# Patient Record
Sex: FEMALE | Race: WHITE
[De-identification: names, ages, dates, MRNs, and addresses within clinical notes are randomized per-mention and may not be internally consistent; named-entity substitution may affect disease eponyms.]

---

## 2012-09-13 VITALS — DIASTOLIC BLOOD PRESSURE: 78 MMHG | SYSTOLIC BLOOD PRESSURE: 130 MMHG

## 2018-07-01 NOTE — XRAY
Indication: Chest pain.



Comparison: January 12, 2015.



Portable chest demonstrates borderline cardiomegaly, less than before.

Remaining lungs and bony thorax unremarkable.  No new/acute findings.

## 2018-07-01 NOTE — ERPHSYRPT
- History of Present Illness


Time Seen by Provider: 07/01/18 10:47


Source: patient


Exam Limitations: no limitations


Patient Subjective Stated Complaint: sore throat and acid reflux for three 

days.  took extra prilosec yesterday with relief.  patient and mother are 

concerned about taking too much prilosec with the renal failure.


Triage Nursing Assessment: ambulated to room per self.  skin w/d, color sallow.

  resp easy.


Physician History: 





28-year-old white female with history of the asthma, hypothyroidism, anxiety, 

ADD, bipolar depression, OCD


Who states she is on CAPD secondary to renal failure.





Arrives with complaint of chest pain, located in the sternal area described as 

like something is in there and it hurts to swallow.





Patient without shortness of breath no nausea no vomiting.


Patient initially presented to the nurses and stated that she was having a sore 

throat.





Patient states symptoms since 9:00 last night.





Past medical history includes asthma, hypothyroidism, anxiety, ADD, bipolar, 

depression, OCD.





Past surgical history is negative


Timing/Duration: yesterday (last night at 9:00)


Severity: moderate


Modifying Factors: Improves With: nothing


Associated Symptoms: chest pain, No nausea, No vomiting, No abdominal pain, No 

shortness of breath, No heartburn, No diaphoresis, No cough, No chills, No fever

, No headaches, No loss of appetite, No malaise, No rash, No seizure


Allergies/Adverse Reactions: 








sulfamethoxazole [From Bactrim] Allergy (Verified 07/01/18 10:20)


 


trimethoprim [From Bactrim] Allergy (Verified 07/01/18 10:20)


 





Home Medications: 








Alprazolam [Xanax] 1 mg PO DAILY 01/12/15 [History]


Fluvoxamine Maleate 100 mg PO BID 01/12/15 [History]


Ibuprofen 800 mg PO TID 01/12/15 [History]


Levothyroxine Sodium 100 Mcg** [Synthroid 100 Mcg***] 112 mcg PO DAILY 01/12/15 

[History]


Lithium Carbonate 300 mg*** [Lithium Carbonate 300 MG***] 150 mg PO BID 01/12/

15 [History]


Norethindrone-Ethinyl Estrad [Nortrel 1-35 Tablet] 1 each PO DAILY 01/12/15 [

History]


Adalimumab [Humira Pen] 40 mg SQ UD 07/01/18 [History]


Amlodipine Besylate 5 mg*** [Norvasc 5 mg***] 5 mg PO DAILY 07/01/18 [History]


Calcitriol [Rocaltrol] 0.25 mcg PO 3XW 07/01/18 [History]


Cinacalcet HCl [Sensipar] 30 mg PO DAILY 07/01/18 [History]


Ferric Citrate [Auryxia] 420 mg PO UD 07/01/18 [History]


Ferric Citrate [Auryxia] 630 mg PO AC 07/01/18 [History]


Hydrocodone/Acetaminophen [Norco 5-325 Tablet] 1 each PO DAILY 07/01/18 [History

]


Melatonin/Pyridoxine HCl (B6) [Melatonin 10 mg Tablet] 1 each PO HS 07/01/18 [

History]


Omeprazole 40 mg PO DAILY 07/01/18 [History]


Potassium Chloride 10 Meq Tab* [Klor Con 10 MEQ***] 10 meq PO BID 07/01/18 [

History]





Hx Tetanus, Diphtheria Vaccination/Date Given: Yes


Hx Influenza Vaccination/Date Given: Yes


Hx Pneumococcal Vaccination/Date Given: Yes





- Review of Systems


Constitutional: No Fever, No Chills


Eyes: No Symptoms


Ears, Nose, & Throat: No Symptoms


Respiratory: No Cough, No Dyspnea


Cardiac: Chest Pain


Abdominal/Gastrointestinal: No Abdominal Pain, No Nausea, No Vomiting, No 

Diarrhea


Genitourinary Symptoms: No Dysuria


Musculoskeletal: No Back Pain, No Neck Pain


Skin: No Rash


Neurological: No Dizziness, No Focal Weakness, No Sensory Changes


Psychological: No Symptoms


Endocrine: No Symptoms


All Other Systems: Reviewed and Negative





- Past Medical History


Pertinent Past Medical History: Yes


Neurological History: No Pertinent History


Cardiac History: Hypertension


Respiratory History: Asthma


Endocrine Medical History: Hypothyroidism


Musculoskeletal History: Fibromyalgia, Rheumatoid Arthritis


 History: Renal Disease


Psycho-Social History: Anxiety, Attention Deficit Disorder, Bipolar, Depression


Other Medical History: ocd, renal failure





- Past Surgical History


Past Surgical History: Yes


Other Surgical History: fistula placment left arm (does not work), peritoneal 

port placed





- Social History


Smoking Status: Current every day smoker


How long have you smoked: 3


Exposure to second hand smoke: Yes


Drug Use: none


Patient Lives Alone: No





- Female History


Hx Pregnant Now: No





- Nursing Vital Signs


Nursing Vital Signs: 


 Initial Vital Signs











Temperature  98 F   07/01/18 10:15


 


Pulse Rate  77   07/01/18 10:15


 


Respiratory Rate  16   07/01/18 10:15


 


Blood Pressure  147/95   07/01/18 10:15


 


O2 Sat by Pulse Oximetry  98   07/01/18 10:15








 Pain Scale











Pain Intensity                 8

















- Physical Exam


General Appearance: no apparent distress, alert, obese


Eye Exam: PERRL/EOMI, eyes nml inspection


Ears, Nose, Throat Exam: normal ENT inspection, TMs normal, pharynx normal, 

moist mucous membranes


Neck Exam: normal inspection, non-tender, supple, full range of motion


Respiratory Exam: normal breath sounds, lungs clear, No respiratory distress


Cardiovascular Exam: regular rate/rhythm, normal heart sounds, normal 

peripheral pulses


Gastrointestinal/Abdomen Exam: soft, normal bowel sounds, No tenderness, No mass


Back Exam: normal inspection, normal range of motion, No CVA tenderness, No 

vertebral tenderness


Extremity Exam: normal inspection, normal range of motion, pelvis stable


Neurologic Exam: alert, oriented x 3, cooperative, CNs II-XII nml as tested, 

normal mood/affect, nml cerebellar function, nml station & gait, sensation nml, 

No motor deficits


Skin Exam: normal color, warm, dry, No rash


**SpO2 Interpretation**: normal (98%)


SpO2: 98


Oxygen Delivery: Room Air





- Course


Nursing assessment & vital signs reviewed: Yes


EKG Interpreted by Me: RATE (77 bpm), Sinus Rhythm, NORMAL AXIS, Other (EKG: 

Sinus rhythm, 77 bpm, normal axis, Q wave an isolated ST elevation in lead 3, 

no acute changes as compaed to previous ekg)





- Radiology Exams


  ** Chest


X-ray Interpretation: Interpreted by me (no acute disease process noted.)


Ordered Tests: 


 Active Orders 24 hr











 Category Date Time Status


 


 EKG-ER Only STAT Care  07/01/18 10:50 Active


 


 IV Insertion STAT Care  07/01/18 10:50 Active


 


 CHEST 1 VIEW (PORTABLE) Stat Exams  07/01/18 10:51 Taken


 


 AMYLASE Routine Lab  07/01/18 11:15 Completed


 


 AMYLASE Stat Lab  07/01/18 14:33 Completed


 


 CBC W DIFF Stat Lab  07/01/18 11:15 Completed


 


 CMP Routine Lab  07/01/18 11:15 Completed


 


 HCG QUALITATIVE,SERUM Stat Lab  07/01/18 11:15 Completed


 


 LIPASE Routine Lab  07/01/18 11:15 Completed


 


 LIPASE Stat Lab  07/01/18 14:33 Completed


 


 Manual Differential NC Stat Lab  07/01/18 11:15 Completed


 


 TROPONIN Q3H Lab  07/01/18 11:15 Completed


 


 TROPONIN Q3H Lab  07/01/18 14:00 Completed


 


 TROPONIN Q3H Lab  07/01/18 17:00 Ordered


 


 TROPONIN Q3H Lab  07/01/18 20:00 Ordered


 


 TROPONIN Q3H Lab  07/01/18 23:00 Ordered








Medication Summary














Discontinued Medications














Generic Name Dose Route Start Last Admin





  Trade Name Freq  PRN Reason Stop Dose Admin


 


Aspirin  162 mg  07/01/18 10:50  07/01/18 11:03





  Baby Aspirin 81 Mg Chew***  PO  07/01/18 10:51  162 mg





  STAT ONE   Administration





     





     





     





     


 


Morphine Sulfate  4 mg  07/01/18 13:17  07/01/18 13:36





  Morphine Sulfate 4 Mg Inj***  IV  07/01/18 13:18  4 mg





  STAT ONE   Administration





     





     





     





     


 


Morphine Sulfate  Confirm  07/01/18 13:20  





  Morphine Sulfate 4 Mg Inj***  Administered  07/01/18 13:21  





  Dose   





  4 mg   





  .ROUTE   





  .STK-MED ONE   





     





     





     





     


 


Ondansetron HCl  4 mg  07/01/18 13:17  07/01/18 13:36





  Zofran 4 Mg/2 Ml Vial**  IV  07/01/18 13:18  4 mg





  STAT ONE   Administration





     





     





     





     


 


Ondansetron HCl  Confirm  07/01/18 13:20  





  Zofran 4 Mg/2 Ml Vial**  Administered  07/01/18 13:21  





  Dose   





  4 mg   





  .ROUTE   





  .STK-MED ONE   





     





     





     





     











Lab/Rad Data: 


 Laboratory Result Diagrams





 07/01/18 11:15 





 07/01/18 11:15 





 Laboratory Results











  07/01/18 07/01/18 07/01/18 Range/Units





  14:33 14:00 11:15 


 


WBC     (4.0-10.5)  K/mm3


 


RBC     (4.1-5.4)  M/mm3


 


Hgb     (12.0-16.0)  gm/dl


 


Hct     (35-47)  %


 


MCV     ()  fl


 


MCH     (26-32)  pg


 


MCHC     (32-36)  g/dl


 


RDW     (11.5-14.0)  %


 


Plt Count     (150-450)  K/mm3


 


MPV     (6-9.5)  fl


 


Absolute Granulocytes     (1.4-6.9)  


 


Segmented Neutrophils     (36.0-66.0)  %


 


Lymphocytes (Manual)     (24-44)  %


 


Monocytes (Manual)     (0.0-12.0)  %


 


Eosinophils (Manual)     (0.00-3.0)  %


 


Platelet Estimate     (NORMAL)  


 


RBC Morphology     


 


Sodium     (137-145)  mmol/L


 


Potassium     (3.5-5.1)  mmol/L


 


Chloride     ()  mmol/L


 


Carbon Dioxide     (22-30)  mmol/L


 


Anion Gap     (5-15)  MEQ/L


 


BUN     (7-17)  mg/dL


 


Creatinine     (0.52-1.04)  mg/dL


 


Estimated GFR     ML/MIN


 


Glucose     ()  mg/dL


 


Calcium     (8.4-10.2)  mg/dL


 


Total Bilirubin     (0.2-1.3)  mg/dL


 


AST     (14-36)  U/L


 


ALT     (0-35)  U/L


 


Alkaline Phosphatase     ()  U/L


 


Troponin I   0.019   (0.000-0.034)  ng/mL


 


Serum Total Protein     (6.3-8.2)  g/dL


 


Albumin     (3.5-5.0)  g/dL


 


Amylase  69    ()  U/L


 


Lipase  96    ()  U/L


 


Serum Pregnancy, Qual    NEGATIVE  (Negative)  














  07/01/18 07/01/18 Range/Units





  11:15 11:15 


 


WBC   14.4 H  (4.0-10.5)  K/mm3


 


RBC   2.87 L  (4.1-5.4)  M/mm3


 


Hgb   9.1 L  (12.0-16.0)  gm/dl


 


Hct   28.6 L  (35-47)  %


 


MCV   99.7  ()  fl


 


MCH   31.7  (26-32)  pg


 


MCHC   31.8 L  (32-36)  g/dl


 


RDW   13.4  (11.5-14.0)  %


 


Plt Count   295  (150-450)  K/mm3


 


MPV   9.7 H  (6-9.5)  fl


 


Absolute Granulocytes   12.12 H  (1.4-6.9)  


 


Segmented Neutrophils   87 H  (36.0-66.0)  %


 


Lymphocytes (Manual)   8 L  (24-44)  %


 


Monocytes (Manual)   4  (0.0-12.0)  %


 


Eosinophils (Manual)   1  (0.00-3.0)  %


 


Platelet Estimate   NORMAL  (NORMAL)  


 


RBC Morphology   NORMAL  


 


Sodium  137   (137-145)  mmol/L


 


Potassium  3.8   (3.5-5.1)  mmol/L


 


Chloride  98   ()  mmol/L


 


Carbon Dioxide  26   (22-30)  mmol/L


 


Anion Gap  16.1 H   (5-15)  MEQ/L


 


BUN  101 H   (7-17)  mg/dL


 


Creatinine  15.68 H   (0.52-1.04)  mg/dL


 


Estimated GFR  3.0   ML/MIN


 


Glucose  92   ()  mg/dL


 


Calcium  8.3 L   (8.4-10.2)  mg/dL


 


Total Bilirubin  0.40   (0.2-1.3)  mg/dL


 


AST  18   (14-36)  U/L


 


ALT  22   (0-35)  U/L


 


Alkaline Phosphatase  443 H   ()  U/L


 


Troponin I  0.020   (0.000-0.034)  ng/mL


 


Serum Total Protein  6.5   (6.3-8.2)  g/dL


 


Albumin  3.7   (3.5-5.0)  g/dL


 


Amylase  70   ()  U/L


 


Lipase  104   ()  U/L


 


Serum Pregnancy, Qual    (Negative)  














- Progress


Progress: improved


Progress Note: 





07/01/18 13:18


28-year-old white female arrives with complaint of pain in the low sternal area 

since last night initially told the nurse and felt like she was having reflux 

and was making her throat sore however when I talk with the patient she states 

that she had pain.





The pain has been constant since last night.


Patient does have a history of peritoneal dialysis.





Patient with an EKG which shows isolated ST elevation in lead 3 there does not 

appear to be any other changes an EKG is unchanged from previous EKGs.


Patient has a chest x-ray was unremarkable.


Patient with a white count of 14.4 hemoglobin 9.1 hematocrit 28.6 platelets 295.





Patient's chemistry sodium 137 potassium 3.8 chloride 98 bicarbonate 26  

creatinine 15.6 glucose is 92.








Patient's alkaline phosphatase 443 troponin is normal at 0.020 hCG is negative.





Patient is given 281 mg aspirin she states she is still having pain.





Note is made of her markedly elevated BUN and creatinine the patient's mother 

states that is normal for her and she had does peritoneal dialysis at home.


Will give Motrin patient morphine 4 mg IV Zofran 4 mg IV obtain amylase and 

lipase.


And we will obtain repeat troponin.


Patient and her mother are wanting the patient to go home.





07/01/18 14:48


Patient is in no distress she is eating





Repeat troponin within normal limits amylase lipase are within normal limits.





Will discharge patient patient to continue CAPD as directed by her renal 

physician and/or family physician.


She is to follow-up with her family doctor.








- Departure


Time of Disposition: 14:48


Departure Disposition: Home


Clinical Impression: 


 Non-cardiac chest pain





Chronic renal failure


Qualifiers:


 Chronic kidney disease stage: unspecified stage Qualified Code(s): N18.9 - 

Chronic kidney disease, unspecified





Condition: Fair


Critical Care Time: No


Referrals: 


MIHIR MCQUEEN [Primary Care Provider] - 


Additional Instructions: 


Return home.


Plenty of fluids.


Follow-up with your family doctor.  CAPD as directed by your renal physician/

family doctor.


Return for acute distress or for severe symptoms.

## 2019-02-09 NOTE — ERPHSYRPT
- History of Present Illness


Time Seen by Provider: 02/09/19 10:35


Source: patient, family


Exam Limitations: no limitations


Patient Subjective Stated Complaint: WAS CUTTING POTATOES THIS AND AND CUT 2ND 

DIGIT LEFT HAND WITH THE PARING KNIFE.  HAS PUT PRESSURE ON SITE WITH A BANDAID.


Triage Nursing Assessment: AMBULATED TO ROOM PER SELF.  SKIN W/D, COLOR NORMAL, 

RESP EASY.  HAS 2CM LAC TO 2ND DIGIT LEFT HAND WITH SMALL AMT BLEEDING.  IS 

ABLE TO BEND FINGER.


Physician History: 


29 y/o right handed white female was peeling potatoes this am and accidentally 

cut her left index finger. could not stop bleeding. tetanus not utd. 


Timing/Duration: today


Quality: painful


Severity: mild


Location: hands (left index finger)


Associated Symptoms: denies symptoms


Allergies/Adverse Reactions: 








heparin Allergy (Verified 02/09/19 09:52)


 


hydromorphone [From Dilaudid] Allergy (Verified 02/09/19 10:00)


 


naproxen Allergy (Verified 02/09/19 10:00)


 


sulfamethoxazole [From Bactrim] Allergy (Verified 07/01/18 10:20)


 


trimethoprim [From Bactrim] Allergy (Verified 07/01/18 10:20)


 


vancomycin Allergy (Verified 02/09/19 10:00)


 





Home Medications: 








Alprazolam [Xanax] 1 mg PO DAILY 01/12/15 [History]


Fluvoxamine Maleate 100 mg PO BID 01/12/15 [History]


Levothyroxine Sodium 100 Mcg** [Synthroid 100 Mcg***] 112 mcg PO DAILY 01/12/15 

[History]


Lithium Carbonate 300 mg*** [Lithium Carbonate 300 MG***] 150 mg PO BID 01/12/

15 [History]


Adalimumab [Humira Pen] 40 mg SQ UD 07/01/18 [History]


Amlodipine Besylate 5 mg*** [Norvasc 5 mg***] 5 mg PO DAILY 07/01/18 [History]


Calcitriol [Rocaltrol] 0.25 mcg PO 3XW 07/01/18 [History]


Cinacalcet HCl [Sensipar] 30 mg PO DAILY 07/01/18 [History]


Ferric Citrate [Auryxia] 420 mg PO UD 07/01/18 [History]


Ferric Citrate [Auryxia] 630 mg PO AC 07/01/18 [History]


Hydrocodone/Acetaminophen [Norco 5-325 Tablet] 1 each PO DAILY 07/01/18 [History

]


Melatonin/Pyridoxine HCl (B6) [Melatonin 10 mg Tablet] 1 each PO HS 07/01/18 [

History]


Omeprazole 40 mg PO DAILY 07/01/18 [History]


Potassium Chloride 10 Meq Tab* [Klor Con 10 MEQ***] 10 meq PO BID 07/01/18 [

History]


Ergocalciferol (Vitamin D2) [Vitamin D] 50,000 unit PO UD 02/09/19 [History]





Hx Tetanus, Diphtheria Vaccination/Date Given: Yes


Hx Influenza Vaccination/Date Given: Yes


Hx Pneumococcal Vaccination/Date Given: No





- Review of Systems


Constitutional: No Symptoms


Eyes: No Symptoms


Ears, Nose, & Throat: No Symptoms


Respiratory: No Symptoms


Cardiac: No Symptoms


Abdominal/Gastrointestinal: No Symptoms


Genitourinary Symptoms: No Symptoms


Musculoskeletal: No Symptoms


Skin: Other (1cm lac left index finger)


Neurological: No Symptoms


Psychological: No Symptoms


Endocrine: No Symptoms


Hematologic/Lymphatic: No Symptoms


Immunological/Allergic: No Symptoms


All Other Systems: Reviewed and Negative





- Past Medical History


Pertinent Past Medical History: Yes


Neurological History: No Pertinent History


ENT History: No Pertinent History


Cardiac History: Hypertension


Respiratory History: Asthma


Endocrine Medical History: Hypothyroidism


Musculoskeletal History: Fibromyalgia, Rheumatoid Arthritis


GI Medical History: No Pertinent History


 History: Renal Disease


Psycho-Social History: Anxiety, Attention Deficit Disorder, Bipolar, Depression


Female Reproductive Disorders: No Pertinent History


Other Medical History: ocd, renal failure





- Past Surgical History


Past Surgical History: Yes


Neuro Surgical History: No Pertinent History


Cardiac: No Pertinent History


Respiratory: No Pertinent History


Gastrointestinal: No Pertinent History


Genitourinary: No Pertinent History


Musculoskeletal: No Pertinent History


Female Surgical History: No Pertinent History


Other Surgical History: fistula placment left arm (does not work), peritoneal 

port placed





- Social History


Smoking Status: Former smoker


How long have you smoked: 3


Exposure to second hand smoke: Yes


Drug Use: none


Patient Lives Alone: No





- Female History


Hx Pregnant Now: No





- Nursing Vital Signs


Nursing Vital Signs: 


 Initial Vital Signs











Temperature  97.6 F   02/09/19 09:44


 


Pulse Rate  77   02/09/19 09:44


 


Respiratory Rate  16   02/09/19 09:44


 


Blood Pressure  125/83   02/09/19 09:44


 


O2 Sat by Pulse Oximetry  97   02/09/19 09:44








 Pain Scale











Pain Intensity                 3

















- Physical Exam


General Appearance: no apparent distress, alert


Eye Exam: PERRL/EOMI, eyes nml inspection


Ears, Nose, Throat Exam: normal ENT inspection, moist mucous membranes


Neck Exam: normal inspection, non-tender, supple, full range of motion


Respiratory Exam: normal breath sounds, lungs clear, airway intact, No chest 

tenderness, No respiratory distress, No accessory muscle use, No rhonchi, No 

wheezing, No stridor


Cardiovascular Exam: regular rate/rhythm, normal heart sounds, normal 

peripheral pulses


Gastrointestinal/Abdomen Exam: soft, normal bowel sounds, No tenderness, No 

guarding


Pelvic Exam: not done


Rectal Exam: not done


Back Exam: normal inspection, normal range of motion, No CVA tenderness, No 

vertebral tenderness


Extremity Exam: normal inspection, normal range of motion, No pelvis stable


Neurologic Exam: alert, oriented x 3, cooperative, CNs II-XII nml as tested


Skin Exam: normal color, warm, dry, laceration (1cm laceration dorsal aspect 

mid left index finger. oozes blood when finger flexed.  nv intact, tendon 

function intact)


Lymphatic Exam: No adenopathy


SpO2: 97


O2 Delivery: Room Air





Procedures





- Laceration/Wound Repair


  ** Left Dorsal Finger


Wound Location: Left, hand


Wound Length (cm): 1


Wound's Depth, Shape: superficial


Wound Explored: clean


Hibiclens Prep: Yes


Anesthesia: local, 1% Lidocaine


Wound Repaired With: sutures (2)


Suture Size/Type: 4-0, prolene


Number of Sutures: 2


Sterile Dressing Applied?: Yes


Progress: 





02/09/19 11:27


no complications. pt [facility] well





- Course


Nursing assessment & vital signs reviewed: Yes


Ordered Tests: 


Medication Summary














Discontinued Medications














Generic Name Dose Route Start Last Admin





  Trade Name Freq  PRN Reason Stop Dose Admin


 


Bacitracin Zinc  0.9 gm  02/09/19 10:59  02/09/19 11:10





  Baciguent Packet***  TP  02/09/19 11:00  0.9 gm





  STAT ONE   Administration





     





     





     





     


 


Bacitracin Zinc  Confirm  02/09/19 11:07  





  Baciguent Packet***  Administered  02/09/19 11:08  





  Dose   





  1 gm   





  .ROUTE   





  .STK-MED ONE   





     





     





     





     


 


Diphtheria/Tetanus/Acell Pertussis  0.5 ml  02/09/19 10:56  02/09/19 11:09





  Adacel Vial***  IM  02/09/19 10:57  0.5 ml





  .ONCE ONE   Administration





     





     





     





     


 


Diphtheria/Tetanus/Acell Pertussis  Confirm  02/09/19 11:07  





  Adacel Vial***  Administered  02/09/19 11:08  





  Dose   





  0.5 ml   





  IM   





  .STK-MED ONE   





     





     





     





     


 


Lidocaine HCl  50 mg  02/09/19 11:01  02/09/19 11:09





  Xylocaine-Mpf 1% 5ml Sdv***  IJ  02/09/19 11:02  Not Given





  STAT ONE   





     





     





     





     


 


Lidocaine HCl  Confirm  02/09/19 11:07  





  Xylocaine 1% Hcl 20 Ml Mdv***  Administered  02/09/19 11:08  





  Dose   





  5 ml   





  .ROUTE   





  .STK-MED ONE   





     





     





     





     


 


Lidocaine HCl  5 ml  02/09/19 11:08  02/09/19 11:09





  Xylocaine 1% Hcl 20 Ml Mdv***  IJ  02/09/19 11:09  5 ml





  STAT ONE   Administration





     





     





     





     














- Progress


Progress: improved


Counseled pt/family regarding: diagnosis, need for follow-up





- Departure


Time of Disposition: 11:28


Departure Disposition: Home


Clinical Impression: 


 Finger laceration





Condition: Stable


Critical Care Time: No


Referrals: 


JIGNA THOMPSON MD [Primary Care Provider] - 


Additional Instructions: 


keep dry for 24 hours. after 24 hours, may wash daily with soap and water. 

apply antibiotic ointment and bandaid after each wash. suture removal in 8 ot 

10 days.

## 2019-06-11 NOTE — ERPHSYRPT
- History of Present Illness


Historian: patient


Exam Limitations: no limitations


Patient Subjective Stated Complaint: pt is alert and oriented. pt is ambulatory 

with a steady gait. pt comes in with c/o pelvic pain. pt states that she's had 

this pain for "about a week" pt states that she had mirena put in a week ago 

and was also put on Otezla and had a reaction to it that included n/v/d. pt 

states that she is having a small amount of vaginal spotting that is pink in 

color. pt states that she is having some burning with urination. pt bowel 

sounds are normoactive throughout. pt is a peritoneal dialysis pt and has 

dialysis daily. pt was diagnosed with peritonitis at the end of May and was put 

on clindamycin and cubicin for that.


Triage Nursing Assessment: see above


Physician History: 





Pt is a 30 y/o female with a H/O PD, that presented to the ED with abdominal 

pain, that is suprapubic.  Per pt she had her PD fluid checked today and that 

was normal, with no signs of infection.  Pt denies F/C/S.  No SOB or cough.  

She does produce some urine but only minimally.  Pt does complain of signs of 

UTI, and states, had it before.


Timing/Duration: today


Activities at Onset: none


Quality: cramping


Abdominal Pain Onset Location: suprapubic


Pain Radiation: no radiation


Severity of Pain-Max: mild


Severity of Pain-Current: mild


Modifying Factors: Improves With: analgesics


Associated Symptoms: denies symptoms


Previous symptoms: no prior history


Allergies/Adverse Reactions: 








heparin Allergy (Verified 02/09/19 09:52)


 


hydromorphone [From Dilaudid] Allergy (Verified 02/09/19 10:00)


 


naproxen Allergy (Verified 02/09/19 10:00)


 


sulfamethoxazole [From Bactrim] Allergy (Verified 07/01/18 10:20)


 


trimethoprim [From Bactrim] Allergy (Verified 07/01/18 10:20)


 


vancomycin Allergy (Verified 02/09/19 10:00)


 





Home Medications: 








Alprazolam [Xanax] 0.5 mg PO BID 01/12/15 [History]


Fluvoxamine Maleate 100 mg PO BID 01/12/15 [History]


Levothyroxine Sodium 100 Mcg** [Synthroid 100 Mcg***] 137 mcg PO DAILY 01/12/15 

[History]


Lithium Carbonate 300 mg*** [Lithium Carbonate 300 MG***] 150 mg PO BID 01/12/

15 [History]


Amlodipine Besylate 5 mg*** [Norvasc 5 mg***] 5 mg PO DAILY 07/01/18 [History]


Calcitriol [Rocaltrol] 0.5 mcg PO DAILY 07/01/18 [History]


Ferric Citrate [Auryxia] 420 mg PO UD 07/01/18 [History]


Ferric Citrate [Auryxia] 630 mg PO AC 07/01/18 [History]


Hydrocodone/Acetaminophen [Norco 5-325 Tablet] 1 each PO BID PRN PRN 07/01/18 [

History]


Melatonin/Pyridoxine HCl (B6) [Melatonin 10 mg Tablet] 10 mg PO HS 07/01/18 [

History]


Omeprazole 40 mg PO DAILY 07/01/18 [History]


Potassium Chloride 10 Meq Tab* [Klor Con 10 MEQ***] 80 meq PO DAILY 07/01/18 [

History]


Ergocalciferol (Vitamin D2) [Vitamin D] 50,000 unit PO UD 02/09/19 [History]


Albuterol Sulfate [Proair Hfa] 8.5 gm IH Q4H PRN PRN 06/11/19 [History]





Hx Tetanus, Diphtheria Vaccination/Date Given: Yes


Hx Influenza Vaccination/Date Given: Yes


Hx Pneumococcal Vaccination/Date Given: No


Immunizations Up to Date: Yes





- Review of Systems


Constitutional: No Fever, No Chills


Eyes: No Symptoms


Ears, Nose, & Throat: No Symptoms


Respiratory: No Cough, No Dyspnea


Cardiac: No Chest Pain, No Edema, No Syncope


Abdominal/Gastrointestinal: Abdominal Pain (suprapubic)


Genitourinary Symptoms: No Symptoms, Other (PD pt.)


Musculoskeletal: No Back Pain, No Neck Pain


Neurological: No Dizziness, No Focal Weakness, No Sensory Changes





- Past Medical History


Pertinent Past Medical History: Yes


Neurological History: No Pertinent History


ENT History: No Pertinent History


Cardiac History: Hypertension


Respiratory History: Asthma


Endocrine Medical History: Hypothyroidism


Musculoskeletal History: Fibromyalgia, Rheumatoid Arthritis


GI Medical History: No Pertinent History


 History: Renal Disease


Psycho-Social History: Anxiety, Attention Deficit Disorder, Bipolar, Depression


Female Reproductive Disorders: No Pertinent History


Other Medical History: ocd, renal failure





- Past Surgical History


Past Surgical History: Yes


Neuro Surgical History: No Pertinent History


Cardiac: Cardiac Catheterization


Respiratory: No Pertinent History


Gastrointestinal: No Pertinent History


Genitourinary: Other


Musculoskeletal: No Pertinent History


Female Surgical History: No Pertinent History


Other Surgical History: fistula placment left arm (does not work), peritoneal 

port placed, mirena placement, central line, catheter placements, kidney biopsy





- Social History


Smoking Status: Former smoker


How long have you smoked: 3


Exposure to second hand smoke: Yes


Drug Use: none


Patient Lives Alone: No





- Female History


Hx Pregnant Now: No (mirena a week ago)





- Nursing Vital Signs


Nursing Vital Signs: 





 Initial Vital Signs











Temperature  97.9 F   06/11/19 20:33


 


Pulse Rate  88   06/11/19 20:33


 


Respiratory Rate  18   06/11/19 20:33


 


Blood Pressure  151/102   06/11/19 20:33


 


O2 Sat by Pulse Oximetry  98   06/11/19 20:33








 Pain Scale











Pain Intensity                 8

















- Physical Exam


General Appearance: no apparent distress, alert


Eye Exam: PERRL/EOMI, eyes nml inspection


Ears, Nose, Throat Exam: normal ENT inspection, pharynx normal, moist mucous 

membranes


Neck Exam: normal inspection, non-tender, supple, full range of motion


Respiratory Exam: normal breath sounds, lungs clear, No respiratory distress


Cardiovascular Exam: regular rate/rhythm, normal heart sounds


Gastrointestinal/Abdomen Exam: soft, tenderness (suprapubic), No mass


Back Exam: normal inspection, normal range of motion, No CVA tenderness, No 

vertebral tenderness


Extremity Exam: normal inspection, normal range of motion, pelvis stable


Neurologic Exam: alert, oriented x 3, cooperative, normal mood/affect, nml 

cerebellar function, sensation nml, No motor deficits


SpO2: 100


Ordered Tests: 





 Active Orders 24 hr











 Category Date Time Status


 


 IV Insertion STAT Care  06/11/19 20:57 Active


 


 Urinalysis with Microscopy Stat Lab  06/11/19 21:07 Ordered








Medication Summary











Generic Name Dose Route Start Last Admin





  Trade Name Freq  PRN Reason Stop Dose Admin


 


Ceftriaxone Sodium/Dextrose  1 g in 50 mls @ 100 mls/hr  06/11/19 22:18  





  Rocephin 1 Gm-D5w 50 Ml Bag**  IV  06/11/19 22:47  





  STAT STA   





     





     





     





     














Discontinued Medications














Generic Name Dose Route Start Last Admin





  Trade Name Freq  PRN Reason Stop Dose Admin


 


Hydrocodone Bitart/Acetaminophen  1 tab  06/11/19 22:11  06/11/19 22:14





  Norco 10/325 Mg Tablet***  PO  06/11/19 22:12  1 tab





  ONCE STA   Administration





     





     





     





     


 


Hydrocodone Bitart/Acetaminophen  Confirm  06/11/19 22:13  





  Norco 10/325 Mg Tablet***  Administered  06/11/19 22:14  





  Dose   





  1 tab   





  .ROUTE   





  .STK-MED ONE   





     





     





     





     














- Progress


Progress: unchanged


Progress Note: 





06/11/19 22:23


Pt was seen and examined.  UA was sent, but as pt produced very little urine, 

it was not enough to check.  I explained to the pt, I am treating her 

empirically for UTI, and I gave her Ceftriaxone IV 1gr.  Pt got Norco for pain.

  Pt is advised to see her Nephrologist for ABX dosing, secondarey to being PD 

pt.  For now that ABX will cover her.  


Discussed with : Stephon


Will see patient in: office


Counseled pt/family regarding: need for follow-up





- Departure


Departure Disposition: Home


Clinical Impression: 


 UTI (urinary tract infection)





Condition: Stable


Critical Care Time: No


Referrals: 


JIGNA TOHMPSON MD [Primary Care Provider] - 


Additional Instructions: 


F/U with Nephrology in the AM, for continuing ABX therapy and dosing.  F/U with 

PCP.

## 2019-09-15 ENCOUNTER — HOSPITAL ENCOUNTER (EMERGENCY)
Dept: HOSPITAL 33 - ED | Age: 29
LOS: 1 days | Discharge: HOME | End: 2019-09-16
Payer: MEDICARE

## 2019-09-15 DIAGNOSIS — R91.8: Primary | ICD-10-CM

## 2019-09-15 DIAGNOSIS — R05: ICD-10-CM

## 2019-09-15 DIAGNOSIS — R03.0: ICD-10-CM

## 2019-09-15 PROCEDURE — 99283 EMERGENCY DEPT VISIT LOW MDM: CPT

## 2019-09-15 PROCEDURE — 71046 X-RAY EXAM CHEST 2 VIEWS: CPT

## 2019-09-15 NOTE — ERPHSYRPT
- History of Present Illness


Time Seen by Provider: 09/15/19 23:30


Source: family


Exam Limitations: no limitations


Patient Subjective Stated Complaint: pt c/o cough, congested, and difficulty 

bringing up any sputum with cough.  Hoarseness, sore to neck area.  Pt saw 

[place] NP on Thursday, was given atb and steroid, no improvement.


Triage Nursing Assessment: pt alert and oriented x3, pleasant.  Lungs clear 

throughout, heart tones reg, abd soft with active bs x4 quad, nontender.  Pt c/

o cough and having trouble getting up secretions.  Pt c/o neck pain, achiness 

to that area.


Physician History: 





Patient has had a cough and congestion for 6 days.  Patient was seen 3 days ago 

and started on a Z-pack as her brother was recently diagnosed with viral 

pneumonia.  Patient is not any better and mother is requesting a chest x-ray


Timing/Duration: day(s) (6)


Cough Quality/Degree: moderate, dry cough


Possible Cause: frequent episodes


Modifying Factors: Improves With: albuterol nebulizer (helps her cough ).  

Worsens With: deep breath, rest


Associated Symptoms: cough, muscle aches, nasal drainage, wheezing, No fever, 

No chills, No chest pain/soreness, No dizziness, No earache, No facial pain, No 

headache, No shortness of breath, No sinus infection, No sore throat


International travel in last 2 weeks: No


Allergies/Adverse Reactions: 








hydromorphone [From Dilaudid] Allergy (Verified 02/09/19 10:00)


 


naproxen Allergy (Verified 02/09/19 10:00)


 


sulfamethoxazole [From Bactrim] Allergy (Verified 07/01/18 10:20)


 


trimethoprim [From Bactrim] Allergy (Verified 07/01/18 10:20)


 


vancomycin Allergy (Verified 02/09/19 10:00)


 





Home Medications: 








Alprazolam [Xanax] 0.5 mg PO BID 01/12/15 [History]


Fluvoxamine Maleate 100 mg PO BID 01/12/15 [History]


Levothyroxine Sodium 100 Mcg** [Synthroid 100 Mcg***] 137 mcg PO DAILY 01/12/15 

[History]


Lithium Carbonate 300 mg*** [Lithium Carbonate 300 MG***] 150 mg PO BID 01/12/

15 [History]


Amlodipine Besylate 5 mg*** [Norvasc 5 mg***] 5 mg PO DAILY 07/01/18 [History]


Calcitriol [Rocaltrol] 0.5 mcg PO DAILY 07/01/18 [History]


Ferric Citrate [Auryxia] 420 mg PO UD 07/01/18 [History]


Ferric Citrate [Auryxia] 630 mg PO AC 07/01/18 [History]


Hydrocodone/Acetaminophen [Norco 5-325 Tablet] 1 each PO BID PRN PRN 07/01/18 [

History]


Melatonin/Pyridoxine HCl (B6) [Melatonin 10 mg Tablet] 10 mg PO HS 07/01/18 [

History]


Ergocalciferol (Vitamin D2) [Vitamin D] 50,000 unit PO UD 02/09/19 [History]


Albuterol Sulfate [Proair Hfa] 8.5 gm IH Q4H PRN PRN 06/11/19 [History]


Carvedilol 12.5 mg*** [Coreg 12.5 mg***] 12.5 mg PO BID 09/15/19 [History]


Pantoprazole Sodium [Protonix] 40 mg PO DAILY 09/15/19 [History]





Hx Tetanus, Diphtheria Vaccination/Date Given: Yes


Hx Influenza Vaccination/Date Given: Yes


Hx Pneumococcal Vaccination/Date Given: Yes


Immunizations Up to Date: Yes





- Review of Systems


Constitutional: No Fever, No Chills


Eyes: No Eye Pain, No Eye Redness, No Vision Changes


Ears, Nose, & Throat: No Symptoms, Nose Congestion, No Ear Discharge, No Throat 

Pain


Respiratory: No Cough, No Dyspnea


Cardiac: No Chest Pain, No Edema, No Syncope


Abdominal/Gastrointestinal: No Abdominal Pain, No Nausea, No Vomiting, No 

Diarrhea


Genitourinary Symptoms: No Dysuria, No Flank Pain


Musculoskeletal: Myalgias, No Back Pain, No Neck Pain


Skin: No Rash


Neurological: No Dizziness, No Focal Weakness, No Sensory Changes


Psychological: No Symptoms


Endocrine: No Symptoms


Hematologic/Lymphatic: No Easy Bleeding, No Easy Bruising


All Other Systems: Reviewed and Negative





- Past Medical History


Pertinent Past Medical History: Yes


Neurological History: No Pertinent History


ENT History: No Pertinent History


Cardiac History: Hypertension


Respiratory History: Asthma


Endocrine Medical History: Hypothyroidism


Musculoskeletal History: Fibromyalgia, Rheumatoid Arthritis


GI Medical History: No Pertinent History


 History: Renal Disease


Psycho-Social History: Anxiety, Attention Deficit Disorder, Bipolar, Depression


Female Reproductive Disorders: No Pertinent History


Other Medical History: ocd, renal failure, autoimmune disorder





- Past Surgical History


Past Surgical History: Yes


Neuro Surgical History: No Pertinent History


Cardiac: Cardiac Catheterization


Respiratory: No Pertinent History


Gastrointestinal: No Pertinent History


Genitourinary: Other


Musculoskeletal: No Pertinent History


Female Surgical History: No Pertinent History


Other Surgical History: fistula placment left arm (does not work), dialysis 

cath rt subclavian, mirena placement, kidney biopsy





- Social History


Smoking Status: Former smoker


How long have you smoked: 3


Exposure to second hand smoke: Yes


Drug Use: none


Patient Lives Alone: No





- Female History


Hx Last Menstrual Period: mirena


Hx Pregnant Now: No





- Nursing Vital Signs


Nursing Vital Signs: 


 Initial Vital Signs











Temperature  98.3 F   09/15/19 23:08


 


Pulse Rate  73   09/15/19 23:08


 


Respiratory Rate  17   09/15/19 23:08


 


Blood Pressure  128/88   09/15/19 23:08


 


O2 Sat by Pulse Oximetry  96   09/15/19 23:08








 Pain Scale











Pain Intensity                 0

















- Physical Exam


General Appearance: no apparent distress, alert


Eye Exam: PERRL/EOMI, eyes nml inspection, No scleral icterus


Ears, Nose, Throat Exam: normal ENT inspection, TMs normal, pharynx normal, 

moist mucous membranes


Neck Exam: normal inspection, non-tender, supple, full range of motion


Respiratory Exam: normal breath sounds, lungs clear, airway intact, No chest 

tenderness, No respiratory distress, No diminished breath sounds, No accessory 

muscle use, No prolonged expirations, No crackles/rales, No rhonchi, No wheezing

, No stridor


Cardiovascular Exam: regular rate/rhythm, normal heart sounds, capillary refill 

<2 sec


Gastrointestinal/Abdomen Exam: soft, No tenderness, No distention, No mass


Back Exam: normal inspection, No CVA tenderness, No vertebral tenderness


Extremity Exam: normal inspection, normal range of motion


Neurologic Exam: alert, oriented x 3, cooperative, CNs II-XII nml as tested, 

normal mood/affect, sensation nml, No motor deficits


Skin Exam: normal color, warm, dry, No rash


Lymphatic Exam: No adenopathy


**SpO2 Interpretation**: normal


SpO2: 96


O2 Delivery: Room Air


Ordered Tests: 


 Active Orders 24 hr











 Category Date Time Status


 


 CHEST 2 VIEWS (PA AND LAT) Stat Exams  09/16/19 00:51 Taken














- Progress


Progress: re-examined


Air Movement: good


Progress Note: 





09/16/19 01:41


patient has excellent air movement throughout her lung fields without any 

abnormal breath sounds, no respiratory distress, no hypoxia with no tachypnea 

and no accessory muscle use noted.


Blood Culture(s) Obtained: No


Antibiotics given: No


Counseled pt/family regarding: diagnosis, need for follow-up, rad results





- Departure


Departure Disposition: Home


Clinical Impression: 


 Cough in adult, Elevated blood pressure reading without diagnosis of 

hypertension





Condition: Good


Critical Care Time: No


Referrals: 


JIGNA THOMPSON MD [Primary Care Provider] - Follow Up with PCP/3 days


Instructions:  Cough, Adult (DC)


Additional Instructions: 


We will notify you if the Radiologist interprets anything different from the ED 

physician's interpretation that will change your management.  Return if any 

worse at any time if you have worse cough, worse pain, new shortness of breath, 

new chest pain, new fever or any other concerning signs or symptoms for 

immediate re-evaluation in the emergency department.

## 2019-09-16 VITALS — SYSTOLIC BLOOD PRESSURE: 130 MMHG | DIASTOLIC BLOOD PRESSURE: 73 MMHG | HEART RATE: 70 BPM | OXYGEN SATURATION: 96 %

## 2019-09-16 NOTE — XRAY
Indication: Cough.



Comparison: July 1, 2018.



PA/lateral chest demonstrates new lingula infiltrate/atelectasis and new right

double lumen dialysis catheter.  Stable minimal left midlung calcified pleural

plaquing.  Remaining heart, lungs, and bony thorax unremarkable.



Comment: Lingula finding not reported by interpreting ER clinician.  Telephone

report given to Dr. Person in the ER at 0920 hrs. on September 16, 2019.

## 2020-07-24 ENCOUNTER — HOSPITAL ENCOUNTER (EMERGENCY)
Dept: HOSPITAL 33 - ED | Age: 30
Discharge: HOME | End: 2020-07-24
Payer: MEDICARE

## 2020-07-24 VITALS — OXYGEN SATURATION: 99 % | SYSTOLIC BLOOD PRESSURE: 116 MMHG | DIASTOLIC BLOOD PRESSURE: 58 MMHG

## 2020-07-24 VITALS — HEART RATE: 85 BPM

## 2020-07-24 DIAGNOSIS — S76.011A: Primary | ICD-10-CM

## 2020-07-24 DIAGNOSIS — Z79.891: ICD-10-CM

## 2020-07-24 DIAGNOSIS — M25.551: ICD-10-CM

## 2020-07-24 DIAGNOSIS — I10: ICD-10-CM

## 2020-07-24 DIAGNOSIS — Z79.899: ICD-10-CM

## 2020-07-24 PROCEDURE — 73502 X-RAY EXAM HIP UNI 2-3 VIEWS: CPT

## 2020-07-24 PROCEDURE — 99284 EMERGENCY DEPT VISIT MOD MDM: CPT

## 2020-07-24 PROCEDURE — 96372 THER/PROPH/DIAG INJ SC/IM: CPT

## 2020-07-24 NOTE — ERPHSYRPT
- History of Present Illness


Time Seen by Provider: 07/24/20 19:00


Source: patient, EMS


Exam Limitations: no limitations


Patient Subjective Stated Complaint: PT states "I am not sure what I did, My 

right hip really hurts."


Triage Nursing Assessment: Pt presented alert and oriented X 3, skin pwd. Pt 

able to speak in clear full sentences pt in no apparent respiratory distress. Pt

csm X 4


Physician History: 





30 years old with history of psoriasis presented in the ER with chief complaint 

of right hip pain since she woke up this morning.  Described this as a sharp 

shooting pain moderate to severe intensity, making her unable to bear weight.  

She has been limping.  Relief with resting.  Denies any swelling numbness 

tingling or weakness of right lower extremity.  Denies any fall or trauma.  She 

is able to move her knee and ankle/toes without any limitation.


Allergies/Adverse Reactions: 








hydromorphone [From Dilaudid] Allergy (Verified 02/09/19 10:00)


   


naproxen Allergy (Verified 02/09/19 10:00)


   


sulfamethoxazole [From Bactrim] Allergy (Verified 07/01/18 10:20)


   


trimethoprim [From Bactrim] Allergy (Verified 07/01/18 10:20)


   


vancomycin Allergy (Verified 02/09/19 10:00)


   





Home Medications: 








Alprazolam [Xanax] 0.5 mg PO BID 01/12/15 [History]


Fluvoxamine Maleate 100 mg PO BID 01/12/15 [History]


Levothyroxine Sodium 100 Mcg** [Synthroid 100 Mcg***] 137 mcg PO DAILY 01/12/15 

[History]


Lithium Carbonate 300 mg*** [Lithium Carbonate 300 MG***] 150 mg PO BID 01/12/15

[History]


Amlodipine Besylate 5 mg*** [Norvasc 5 mg***] 5 mg PO DAILY 07/01/18 [History]


Calcitriol [Rocaltrol] 0.5 mcg PO DAILY 07/01/18 [History]


Ferric Citrate [Auryxia] 420 mg PO UD 07/01/18 [History]


Ferric Citrate [Auryxia] 630 mg PO AC 07/01/18 [History]


Hydrocodone/Acetaminophen [Norco 5-325 Tablet] 1 each PO BID PRN PRN 07/01/18 

[History]


Melatonin/Pyridoxine HCl (B6) [Melatonin 10 mg Tablet] 10 mg PO HS 07/01/18 

[History]


Ergocalciferol (Vitamin D2) [Vitamin D] 50,000 unit PO UD 02/09/19 [History]


Albuterol Sulfate [Proair Hfa] 8.5 gm IH Q4H PRN PRN 06/11/19 [History]


Carvedilol 12.5 mg*** [Coreg 12.5 mg***] 12.5 mg PO BID 09/15/19 [History]


Pantoprazole Sodium [Protonix] 40 mg PO DAILY 09/15/19 [History]





Hx Tetanus, Diphtheria Vaccination/Date Given: No


Hx Influenza Vaccination/Date Given: Yes


Hx Pneumococcal Vaccination/Date Given: No


Immunizations Up to Date: Yes





Travel Risk





- International Travel


Have you traveled outside of the country in past 3 weeks: No





- Coronavirus Screening


Are you exhibiting any of the following symptoms?: No


Close contact with a COVID-19 positive Pt in past 14-21 Days: No





- Review of Systems


Constitutional: No Symptoms


Eyes: No Symptoms


Ears, Nose, & Throat: No Symptoms


Respiratory: No Symptoms


Cardiac: No Symptoms


Abdominal/Gastrointestinal: No Symptoms


Genitourinary Symptoms: No Symptoms


Musculoskeletal: Joint Pain


Skin: No Symptoms


Neurological: No Symptoms


Psychological: No Symptoms


Endocrine: No Symptoms


Hematologic/Lymphatic: No Symptoms


Immunological/Allergic: No Symptoms





- Past Medical History


Pertinent Past Medical History: Yes


Neurological History: No Pertinent History


ENT History: No Pertinent History


Cardiac History: Hypertension, Other


Respiratory History: No Pertinent History


Endocrine Medical History: Hypothyroidism, Other


Musculoskeletal History: Fibromyalgia, Rheumatoid Arthritis


GI Medical History: No Pertinent History


 History: Renal Disease


Psycho-Social History: Anxiety, Attention Deficit Disorder, Bipolar, Depression


Female Reproductive Disorders: No Pertinent History


Other Medical History: ANXIETY, DEPRESSION, BIPOLAR, OCD.  LEFT FISTULAR UPPER 

ARM FOR DIALYSIS.  MONDAY, WEDNESDAY, AND FRIDAY.





- Past Surgical History


Past Surgical History: Yes


Neuro Surgical History: No Pertinent History


Cardiac: Cardiac Catheterization


Respiratory: No Pertinent History


Gastrointestinal: No Pertinent History


Genitourinary: Other


Musculoskeletal: No Pertinent History


Female Surgical History: No Pertinent History


Other Surgical History: fistula placment left arm (does not work), dialysis cath

 rt subclavian, mirena placement, kidney biopsy





- Social History


Smoking Status: Former smoker


How long have you smoked: 3


Exposure to second hand smoke: Yes


Drug Use: none


Patient Lives Alone: No





- Female History


Hx Last Menstrual Period: mirena


Hx Pregnant Now: No





- Nursing Vital Signs


Nursing Vital Signs: 


                               Initial Vital Signs











Temperature  97.8 F   07/24/20 18:53


 


Pulse Rate  95 H  07/24/20 18:53


 


Respiratory Rate  20 07/24/20 18:53


 


Blood Pressure  109/76   07/24/20 18:53


 


O2 Sat by Pulse Oximetry  100   07/24/20 18:53








                                   Pain Scale











Pain Intensity                 6

















- Physical Exam


General Appearance: no apparent distress


Eyes, Ears, Nose, Throat Exam: normal ENT inspection


Neck Exam: normal inspection, supple, full range of motion


Cardiovascular/Respiratory Exam: chest non-tender, regular rate/rhythm


Gastrointestinal/Abdominal Exam: non-tender, soft, no organomegaly, No guarding,

 No tenderness


Back Exam: normal inspection, normal range of motion, No CVA tenderness


Hips Exam: right: pain, soft tissue tenderness, bilateral: non-tender, normal 

inspection, normal range of motion, no evidence of injury, bone tenderness


Legs Exam: bilateral leg: non-tender, normal inspection, normal range of motion,

 no evidence of injury


Knees Exam: bilateral knee: non-tender, normal inspection, normal range of 

motion, no evidence of injury


Ankle Exam: bilateral ankle: non-tender, normal inspection, normal range of 

motion, no evidence of injury


Foot Exam: bilateral foot: non-tender, normal inspection, normal range of 

motion, no evidence of injury


Neuro/Tendon Exam: normal sensation, normal motor functions, normal tendon 

functions


Mental Status Exam: alert, oriented x 3, cooperative


Skin Exam: normal color


**SpO2 Interpretation**: normal


SpO2: 100


O2 Delivery: Room Air


Ordered Tests: 


                               Active Orders 24 hr











 Category Date Time Status


 


 HIP UNI (2V) INCL PEL IF DONE Stat Exams  07/24/20 21:16 Completed








Medication Summary














Discontinued Medications














Generic Name Dose Route Start Last Admin





  Trade Name Freq  PRN Reason Stop Dose Admin


 


Ketorolac Tromethamine  30 mg  07/24/20 19:04  07/24/20 19:42





  Toradol 30 Mg Injection***  IM  07/24/20 19:05  30 mg





  STAT ONE   Administration


 


Ketorolac Tromethamine  Confirm  07/24/20 19:41 





  Toradol 30 Mg Injection***  Administered  07/24/20 19:42 





  Dose  





  30 mg  





  .ROUTE  





  .STK-MED ONE  


 


Oxycodone/Acetaminophen  2 tab  07/24/20 20:57  07/24/20 21:02





  Percocet Tablet 5/325mg***  PO  07/24/20 20:58  2 tab





  STAT ONE   Administration


 


Oxycodone/Acetaminophen  Confirm  07/24/20 21:02 





  Percocet Tablet 5/325mg***  Administered  07/24/20 21:03 





  Dose  





  2 tab  





  .ROUTE  





  .STK-MED ONE  














- Progress


Progress: improved


Progress Note: 





07/24/20 


She is given Toradol for pain.  Obtain x-rays which are negative for any obvious

 fracture dislocation reviewed by me.  Official report is pending.  Patient is 

feeling better on reevaluation.  She has no signs of cellulitis.  No swelling or

 calf tenderness or thigh tenderness suggesting DVT.  I believe patient has hip 

strain, will continue with NSAIDs and muscle relaxant to go home.  Discussed 

signs symptoms of worsening needing return to ER which she seemed understanding.

  Stable for discharge.





- Departure


Departure Disposition: Home


Clinical Impression: 


Hip strain


Qualifiers:


 Encounter type: initial encounter Laterality: right Qualified Code(s): S76.011A

 - Strain of muscle, fascia and tendon of right hip, initial encounter





Condition: Stable


Critical Care Time: No


Referrals: 


JIGNA THOMPSON MD [Primary Care Provider] - Follow Up with PCP/3 days


Instructions:  Lower Extremity Muscle Strain (DC), Hip Pain (DC)


Additional Instructions: 


Tylenol/ibuprofen as needed along with muscle relaxants.  Follow-up with primary

care for reevaluation.  Return to ER for any worsening.


Prescriptions: 


Methocarbamol [Robaxin-750] 750 mg PO TID 10 Days #30 tablet

## 2020-07-24 NOTE — XRAY
Indication: Pain.  No known injury.



Comparison: None



2 view right hip demonstrates IUD in situ.  No other bony, articular, or soft

tissue abnormalities.

## 2020-12-26 ENCOUNTER — HOSPITAL ENCOUNTER (EMERGENCY)
Dept: HOSPITAL 33 - ED | Age: 30
Discharge: HOME | End: 2020-12-26
Payer: MEDICARE

## 2020-12-26 VITALS — HEART RATE: 84 BPM | OXYGEN SATURATION: 96 % | SYSTOLIC BLOOD PRESSURE: 150 MMHG | DIASTOLIC BLOOD PRESSURE: 85 MMHG

## 2020-12-26 DIAGNOSIS — N83.202: ICD-10-CM

## 2020-12-26 DIAGNOSIS — R03.0: ICD-10-CM

## 2020-12-26 DIAGNOSIS — N19: Primary | ICD-10-CM

## 2020-12-26 LAB
ALBUMIN SERPL-MCNC: 4.4 G/DL (ref 3.5–5)
ALP SERPL-CCNC: 363 U/L (ref 38–126)
ALT SERPL-CCNC: 13 U/L (ref 0–35)
AMYLASE SERPL-CCNC: 110 U/L (ref 30–110)
ANION GAP SERPL CALC-SCNC: 18.7 MEQ/L (ref 5–15)
AST SERPL QL: 20 U/L (ref 14–36)
BASOPHILS # BLD AUTO: 0.02 10*3/UL (ref 0–0.4)
BASOPHILS NFR BLD AUTO: 0.2 % (ref 0–0.4)
BILIRUB BLD-MCNC: 0.5 MG/DL (ref 0.2–1.3)
BUN SERPL-MCNC: 68 MG/DL (ref 7–17)
CALCIUM SPEC-MCNC: 9 MG/DL (ref 8.4–10.2)
CHLORIDE SERPL-SCNC: 92 MMOL/L (ref 98–107)
CO2 SERPL-SCNC: 29 MMOL/L (ref 22–30)
CREAT SERPL-MCNC: 11.55 MG/DL (ref 0.52–1.04)
EOSINOPHIL # BLD AUTO: 0.16 10*3/UL (ref 0–0.5)
GFR SERPLBLD BASED ON 1.73 SQ M-ARVRAT: 4.1 ML/MIN
GLUCOSE SERPL-MCNC: 89 MG/DL (ref 74–106)
HCT VFR BLD AUTO: 25.2 % (ref 35–47)
HGB BLD-MCNC: 7.8 GM/DL (ref 12–16)
LIPASE SERPL-CCNC: 155 U/L (ref 23–300)
LYMPHOCYTES # SPEC AUTO: 1.28 10*3/UL (ref 1–4.6)
MCH RBC QN AUTO: 32.9 PG (ref 26–32)
MCHC RBC AUTO-ENTMCNC: 31 G/DL (ref 32–36)
MONOCYTES # BLD AUTO: 0.46 10*3/UL (ref 0–1.3)
PLATELET # BLD AUTO: 257 K/MM3 (ref 150–450)
POTASSIUM SERPLBLD-SCNC: 5.6 MMOL/L (ref 3.5–5.1)
PROT SERPL-MCNC: 7.9 G/DL (ref 6.3–8.2)
RBC # BLD AUTO: 2.37 M/MM3 (ref 4.1–5.4)
SODIUM SERPL-SCNC: 134 MMOL/L (ref 137–145)
WBC # BLD AUTO: 8.4 K/MM3 (ref 4–10.5)

## 2020-12-26 PROCEDURE — 36000 PLACE NEEDLE IN VEIN: CPT

## 2020-12-26 PROCEDURE — 96376 TX/PRO/DX INJ SAME DRUG ADON: CPT

## 2020-12-26 PROCEDURE — 74176 CT ABD & PELVIS W/O CONTRAST: CPT

## 2020-12-26 PROCEDURE — 84484 ASSAY OF TROPONIN QUANT: CPT

## 2020-12-26 PROCEDURE — 96374 THER/PROPH/DIAG INJ IV PUSH: CPT

## 2020-12-26 PROCEDURE — 83605 ASSAY OF LACTIC ACID: CPT

## 2020-12-26 PROCEDURE — 85025 COMPLETE CBC W/AUTO DIFF WBC: CPT

## 2020-12-26 PROCEDURE — 99285 EMERGENCY DEPT VISIT HI MDM: CPT

## 2020-12-26 PROCEDURE — 93005 ELECTROCARDIOGRAM TRACING: CPT

## 2020-12-26 PROCEDURE — 96375 TX/PRO/DX INJ NEW DRUG ADDON: CPT

## 2020-12-26 PROCEDURE — 81025 URINE PREGNANCY TEST: CPT

## 2020-12-26 PROCEDURE — 83690 ASSAY OF LIPASE: CPT

## 2020-12-26 PROCEDURE — 96360 HYDRATION IV INFUSION INIT: CPT

## 2020-12-26 PROCEDURE — 36415 COLL VENOUS BLD VENIPUNCTURE: CPT

## 2020-12-26 PROCEDURE — 76830 TRANSVAGINAL US NON-OB: CPT

## 2020-12-26 PROCEDURE — 82150 ASSAY OF AMYLASE: CPT

## 2020-12-26 PROCEDURE — 80053 COMPREHEN METABOLIC PANEL: CPT

## 2020-12-26 NOTE — XRAY
Indication: Left pelvic pain.



Two-dimensional transabdominal pelvic sonogram performed.



Comparison: None



Uterus anteverted measuring 5.5 x 3.3 x 3.7 cm.  No focal solid/cystic uterine

mass.  IUD with tip at the level of the fundus.  Right ovary measures 3.3 x

2.2 x 2.0 cm and the left measures 5.5 x 3.9 x 4.0 cm.  Left ovary

demonstrates a 3.2 x 1.9 x 2.4 cm irregular complex echogenic cyst.  No

suspicious solid adnexal masses or free fluid.



Impression: 3.2 cm complex left ovary cyst better evaluated with transvaginal

sonogram if patient is able.  Incidental IUD in situ.



Comment: Preliminary report was given.

## 2020-12-26 NOTE — XRAY
Indication: Left flank pain.



Multiple contiguous axial images obtained through the abdomen and pelvis

without contrast.



Comparison: None.



Lung bases are clear.  Heart is not enlarged.



Noncontrasted stomach and bowel loops appear nonobstructed.  Normal appendix.

4 cm left ovary cyst.  Uterus demonstrates IUD in situ.  No free fluid/air.



Both kidneys are atrophic with nonobstructing microcalculi.  Spleen is

enlarged measuring 13.9 cm.  Remaining liver, gallbladder, pancreas, spleen,

adrenal glands, kidneys, ureters, bladder, uterus, and aorta are unremarkable

for noncontrast exam.



Osseous structures intact with diffuse osteosclerosis.  No ventral or inguinal

hernias.



Impression:

1.  4 cm left ovary cyst.  Pelvic sonogram may yield further information.

2.  Incidental bilateral renal atrophy, nonobstructing bilateral renal

microcalculi, and splenomegaly.

3.  Diffuse bony sclerosis presumed due to renal osteodystrophy.



Comment: Preliminary interpretation was made by [facility].  No critical discrepancy.

## 2021-02-17 ENCOUNTER — HOSPITAL ENCOUNTER (EMERGENCY)
Dept: HOSPITAL 33 - ED | Age: 31
Discharge: HOME | End: 2021-02-17
Payer: MEDICARE

## 2021-02-17 VITALS — DIASTOLIC BLOOD PRESSURE: 76 MMHG | HEART RATE: 80 BPM | SYSTOLIC BLOOD PRESSURE: 132 MMHG

## 2021-02-17 VITALS — OXYGEN SATURATION: 98 %

## 2021-02-17 DIAGNOSIS — Z99.2: ICD-10-CM

## 2021-02-17 DIAGNOSIS — Z79.891: ICD-10-CM

## 2021-02-17 DIAGNOSIS — N18.6: ICD-10-CM

## 2021-02-17 DIAGNOSIS — I10: ICD-10-CM

## 2021-02-17 DIAGNOSIS — N83.292: Primary | ICD-10-CM

## 2021-02-17 DIAGNOSIS — Z79.899: ICD-10-CM

## 2021-02-17 LAB
ALBUMIN SERPL-MCNC: 4.3 G/DL (ref 3.5–5)
ALP SERPL-CCNC: 366 U/L (ref 38–126)
ALT SERPL-CCNC: < 4 U/L (ref 0–35)
ANION GAP SERPL CALC-SCNC: 15 MEQ/L (ref 5–15)
AST SERPL QL: 22 U/L (ref 14–36)
BASOPHILS # BLD AUTO: 0.01 10*3/UL (ref 0–0.4)
BASOPHILS NFR BLD AUTO: 0.2 % (ref 0–0.4)
BILIRUB BLD-MCNC: 0.3 MG/DL (ref 0.2–1.3)
BUN SERPL-MCNC: 30 MG/DL (ref 7–17)
CALCIUM SPEC-MCNC: 9 MG/DL (ref 8.4–10.2)
CHLORIDE SERPL-SCNC: 92 MMOL/L (ref 98–107)
CO2 SERPL-SCNC: 32 MMOL/L (ref 22–30)
CREAT SERPL-MCNC: 9.6 MG/DL (ref 0.52–1.04)
EOSINOPHIL # BLD AUTO: 0.19 10*3/UL (ref 0–0.5)
GFR SERPLBLD BASED ON 1.73 SQ M-ARVRAT: 5.1 ML/MIN
GLUCOSE SERPL-MCNC: 88 MG/DL (ref 74–106)
HCT VFR BLD AUTO: 34.9 % (ref 35–47)
HGB BLD-MCNC: 10.4 GM/DL (ref 12–16)
LYMPHOCYTES # SPEC AUTO: 0.85 10*3/UL (ref 1–4.6)
MCH RBC QN AUTO: 31.1 PG (ref 26–32)
MCHC RBC AUTO-ENTMCNC: 29.8 G/DL (ref 32–36)
MONOCYTES # BLD AUTO: 0.29 10*3/UL (ref 0–1.3)
PLATELET # BLD AUTO: 273 K/MM3 (ref 150–450)
POTASSIUM SERPLBLD-SCNC: 4.1 MMOL/L (ref 3.5–5.1)
PROT SERPL-MCNC: 7.8 G/DL (ref 6.3–8.2)
RBC # BLD AUTO: 3.34 M/MM3 (ref 4.1–5.4)
SODIUM SERPL-SCNC: 135 MMOL/L (ref 137–145)
WBC # BLD AUTO: 5.5 K/MM3 (ref 4–10.5)

## 2021-02-17 PROCEDURE — 36000 PLACE NEEDLE IN VEIN: CPT

## 2021-02-17 PROCEDURE — 87040 BLOOD CULTURE FOR BACTERIA: CPT

## 2021-02-17 PROCEDURE — 85025 COMPLETE CBC W/AUTO DIFF WBC: CPT

## 2021-02-17 PROCEDURE — 81025 URINE PREGNANCY TEST: CPT

## 2021-02-17 PROCEDURE — 96375 TX/PRO/DX INJ NEW DRUG ADDON: CPT

## 2021-02-17 PROCEDURE — 36415 COLL VENOUS BLD VENIPUNCTURE: CPT

## 2021-02-17 PROCEDURE — 83605 ASSAY OF LACTIC ACID: CPT

## 2021-02-17 PROCEDURE — 99284 EMERGENCY DEPT VISIT MOD MDM: CPT

## 2021-02-17 PROCEDURE — 76830 TRANSVAGINAL US NON-OB: CPT

## 2021-02-17 PROCEDURE — 80053 COMPREHEN METABOLIC PANEL: CPT

## 2021-02-17 PROCEDURE — 96374 THER/PROPH/DIAG INJ IV PUSH: CPT

## 2021-02-17 NOTE — XRAY
Indication: Left lower quadrant pain.  History left ovary cyst.



Two-dimensional transvaginal pelvic sonogram performed.



Comparison: December 26, 2020.



Uterus again anteverted measuring 5.9 x 2.7 x 3.0 cm.  Lower uterine segment

demonstrates a new 3 mm nabothian cyst.  Stable IUD with tip at the level of

the fundus.  Right ovary measures 3.2 x 1.9 x 2.9 cm and the left measures 4.7

x 3.6 x 4.7 cm.  Normal follicular cysts and perfusion bilaterally.  Previous

complex left ovary cyst grossly unchanged measuring 3.4 x 2.8 x 2.2 cm.  No

suspicious solid adnexal mass or free fluid.



Impression: New tiny nabothian cyst.  Grossly stable complex left ovary cyst

and IUD in situ.

## 2021-02-17 NOTE — ERPHSYRPT
- History of Present Illness


Time Seen by Provider: 02/17/21 12:42


Historian: patient


Exam Limitations: no limitations


Patient Subjective Stated Complaint: PT sates "I have an ovarian cyst and I 

think it is causeing me pain again.  I tried to take a norco but it did not he

lp."


Triage Nursing Assessment: PT presented alert and oriented X 3, skin pwd pt 

ambulates with an upright steady gait, able to speak in clear full sentences. pt

is dialysis pt.


Physician History: 





30 years old female with history of end-stage renal disease on dialysis.  

Patient was recently on peritoneal dialysis and PD catheter got infected which 

was removed and currently on antibiotics, going for hemodialysis presented in 

the ER with left lower pelvic pain gradually worsening since last night, 

moderate intensity, sharp in nature without any associated vaginal bleeding or 

discharge.  Patient reports having similar symptoms around [place] time and 

had a ovarian cyst in the left.  Denies any associated nausea or vomiting.  

Denies any fever or chills.  She denies any increasing discharge from PD 

catheter removal site.


Timing/Duration: yesterday, gradual onset, worse


Activities at Onset: rest


Quality: sharpness


Abdominal Pain Onset Location: other (Left pelvis)


Pain Radiation: no radiation


Severity of Pain-Max: moderate


Severity of Pain-Current: moderate


Modifying Factors: Worsens With: movement, palpation


Associated Symptoms: denies symptoms


Previous symptoms: same symptoms as today


Allergies/Adverse Reactions: 








heparin Allergy (Severe, Verified 12/26/20 09:32)


   antibodies


hydromorphone [From Dilaudid] Allergy (Verified 02/09/19 10:00)


   


naproxen Allergy (Verified 02/09/19 10:00)


   


sulfamethoxazole [From Bactrim] Allergy (Verified 07/01/18 10:20)


   


trimethoprim [From Bactrim] Allergy (Verified 07/01/18 10:20)


   


vancomycin Allergy (Verified 02/09/19 10:00)


   





Home Medications: 








Alprazolam [Xanax] 0.5 mg PO BID 01/12/15 [History]


Fluvoxamine Maleate 100 mg PO BID 01/12/15 [History]


Levothyroxine Sodium 100 Mcg** [Synthroid 100 Mcg***] 137 mcg PO DAILY 01/12/15 

[History]


Lithium Carbonate 300 mg*** [Lithium Carbonate 300 MG***] 150 mg PO BID 01/12/15

[History]


Ferric Citrate [Auryxia] 420 mg PO UD 07/01/18 [History]


Ferric Citrate [Auryxia] 630 mg PO AC 07/01/18 [History]


Hydrocodone/Acetaminophen [Norco 5-325 Tablet] 1 each PO BID PRN PRN 07/01/18 

[History]


Melatonin/Pyridoxine HCl (B6) [Melatonin 10 mg Tablet] 10 mg PO HS 07/01/18 

[History]


calcitrioL [Rocaltrol] 0.5 mcg PO DAILY 07/01/18 [History]


Albuterol Sulfate [Proair Hfa] 8.5 gm IH Q4H PRN PRN 06/11/19 [History]


Pantoprazole Sodium [Protonix] 40 mg PO DAILY 09/15/19 [History]


Carvedilol 12.5 mg*** [Coreg 12.5 mg***] 12.5 mg PO DAILY 12/26/20 [History]


Doxycycline Hyclate 100 mg*** [Vibramycin 100 mg***] 100 mg IV BID 02/17/21 

[History]





Hx Tetanus, Diphtheria Vaccination/Date Given: Yes


Hx Influenza Vaccination/Date Given: Yes


Hx Pneumococcal Vaccination/Date Given: No


Immunizations Up to Date: Yes





Travel Risk





- International Travel


Have you traveled outside of the country in past 3 weeks: No





- Coronavirus Screening


Are you exhibiting any of the following symptoms?: No


Close contact with a COVID-19 positive Pt in past 14-21 Days: No





- Review of Systems


Constitutional: No Symptoms


Eyes: No Symptoms


Ears, Nose, & Throat: No Symptoms


Respiratory: No Symptoms


Cardiac: No Symptoms


Abdominal/Gastrointestinal: Abdominal Pain


Genitourinary Symptoms: No Symptoms


Musculoskeletal: No Symptoms


Skin: No Symptoms


Neurological: No Symptoms


Psychological: No Symptoms


Endocrine: No Symptoms


Hematologic/Lymphatic: No Symptoms


Immunological/Allergic: No Symptoms





- Past Medical History


Pertinent Past Medical History: Yes


Neurological History: No Pertinent History


ENT History: No Pertinent History


Cardiac History: Hypertension, Other


Respiratory History: No Pertinent History


Endocrine Medical History: Hypothyroidism, Other


Musculoskeletal History: Fibromyalgia, Rheumatoid Arthritis


GI Medical History: No Pertinent History


 History: Renal Disease


Psycho-Social History: Anxiety, Attention Deficit Disorder, Bipolar, Depression


Female Reproductive Disorders: No Pertinent History


Other Medical History: ANXIETY, DEPRESSION, BIPOLAR, OCD.  LEFT FISTULAR UPPER 

ARM FOR DIALYSIS.  MONDAY, WEDNESDAY, AND FRIDAY.





- Past Surgical History


Past Surgical History: Yes


Neuro Surgical History: No Pertinent History


Cardiac: Cardiac Catheterization


Respiratory: No Pertinent History


Gastrointestinal: No Pertinent History


Genitourinary: Other


Musculoskeletal: No Pertinent History


Female Surgical History: No Pertinent History


Other Surgical History: fistula placment left arm (does not work), dialysis cath

 rt subclavian, mirena placement, kidney biopsy.  aneurysm in left arm.  pd 

catheter removed





- Social History


Smoking Status: Former smoker


How long have you smoked: 3


Exposure to second hand smoke: Yes


Drug Use: none


Patient Lives Alone: No





- Female History


Hx Last Menstrual Period: mirena


Hx Pregnant Now: No





- Nursing Vital Signs


Nursing Vital Signs: 


                               Initial Vital Signs











Temperature  97.8 F   02/17/21 12:40


 


Pulse Rate  78   02/17/21 12:40


 


Respiratory Rate  18   02/17/21 12:40


 


Blood Pressure  135/90   02/17/21 12:40


 


O2 Sat by Pulse Oximetry  99   02/17/21 12:40








                                   Pain Scale











Pain Intensity                 5

















- Physical Exam


General Appearance: no apparent distress, alert


Eye Exam: eyes nml inspection


Ears, Nose, Throat Exam: normal ENT inspection, pharynx normal


Neck Exam: normal inspection, non-tender, supple, full range of motion


Respiratory Exam: normal breath sounds, lungs clear


Cardiovascular Exam: regular rate/rhythm, normal heart sounds


Gastrointestinal/Abdomen Exam: soft, normal bowel sounds, tenderness (Left lower

 quadrant/pelvic area), other (PD catheter removal site minimal erythema, 

minimal discharge, packing in place.)


Neurologic Exam: alert, oriented x 3, cooperative


Skin Exam: normal color


**SpO2 Interpretation**: normal


SpO2: 98


Ordered Tests: 


                               Active Orders 24 hr











 Category Date Time Status


 


 IV Insertion STAT Care  02/17/21 13:01 Active


 


 PELVIS TRANS VAGINAL [US] Stat Exams  02/17/21 13:02 Completed


 


 BLOOD CULTURE Stat Lab  02/17/21 13:30 Received


 


 CBC W DIFF Stat Lab  02/17/21 13:20 Completed


 


 CMP Stat Lab  02/17/21 13:20 Completed


 


 HCG QUALITATIVE,SERUM Stat Lab  02/17/21 14:01 Completed


 


 Lactic Acid Stat Lab  02/17/21 13:35 Completed








Medication Summary














Discontinued Medications














Generic Name Dose Route Start Last Admin





  Trade Name Freq  PRN Reason Stop Dose Admin


 


Fentanyl Citrate  50 mcg  02/17/21 15:09  02/17/21 15:15





  Sublimaze 100 Mcg/2 Ml***  IV  02/17/21 15:10  50 mcg





  STAT ONE   Administration


 


Fentanyl Citrate  Confirm  02/17/21 15:14 





  Sublimaze 100 Mcg/2 Ml***  Administered  02/17/21 15:15 





  Dose  





  100 mcg  





  .ROUTE  





  .STK-MED ONE  


 


Morphine Sulfate  4 mg  02/17/21 13:01  02/17/21 13:34





  Morphine Sulfate 4 Mg Inj***  IV  02/17/21 13:02  4 mg





  STAT ONE   Administration


 


Morphine Sulfate  Confirm  02/17/21 13:30 





  Morphine Sulfate 4 Mg Inj***  Administered  02/17/21 13:31 





  Dose  





  4 mg  





  .ROUTE  





  .STK-MED ONE  


 


Ondansetron HCl  4 mg  02/17/21 13:01  02/17/21 13:31





  Zofran 4 Mg/2 Ml Vial**  IV  02/17/21 13:02  4 mg





  STAT ONE   Administration


 


Ondansetron HCl  Confirm  02/17/21 13:29 





  Zofran 4 Mg/2 Ml Vial**  Administered  02/17/21 13:30 





  Dose  





  4 mg  





  .ROUTE  





  .STK-MED ONE  











Lab/Rad Data: 


                           Laboratory Result Diagrams





                                 02/17/21 13:20 





                                 02/17/21 13:20 





                               Laboratory Results











  02/17/21 02/17/21 02/17/21 Range/Units





  14:01 13:35 13:20 


 


WBC     (4.0-10.5)  K/mm3


 


RBC     (4.1-5.4)  M/mm3


 


Hgb     (12.0-16.0)  gm/dl


 


Hct     (35-47)  %


 


MCV     ()  fl


 


MCH     (26-32)  pg


 


MCHC     (32-36)  g/dl


 


RDW     (11.5-14.0)  %


 


Plt Count     (150-450)  K/mm3


 


MPV     (7.5-11.0)  fl


 


Gran %     (36.0-66.0)  %


 


Eos # (Auto)     (0-0.5)  


 


Absolute Lymphs (auto)     (1.0-4.6)  


 


Absolute Monos (auto)     (0.0-1.3)  


 


Lymphocytes %     (24.0-44.0)  %


 


Monocytes %     (0.0-12.0)  %


 


Eosinophils %     (0.00-5.0)  %


 


Basophils %     (0.0-0.4)  %


 


Absolute Granulocytes     (1.4-6.9)  


 


Basophils #     (0-0.4)  


 


Sodium    135 L  (137-145)  mmol/L


 


Potassium    4.1  (3.5-5.1)  mmol/L


 


Chloride    92 L  ()  mmol/L


 


Carbon Dioxide    32 H  (22-30)  mmol/L


 


Anion Gap    15.0  (5-15)  MEQ/L


 


BUN    30 H  (7-17)  mg/dL


 


Creatinine    9.60 H  (0.52-1.04)  mg/dL


 


Estimated GFR    5.1  ML/MIN


 


Glucose    88  ()  mg/dL


 


Lactic Acid   1.7   (0.4-2.0)  


 


Calcium    9.0  (8.4-10.2)  mg/dL


 


Total Bilirubin    0.30  (0.2-1.3)  mg/dL


 


AST    22  (14-36)  U/L


 


ALT    < 4  (0-35)  U/L


 


Alkaline Phosphatase    366 H  ()  U/L


 


Serum Total Protein    7.8  (6.3-8.2)  g/dL


 


Albumin    4.3  (3.5-5.0)  g/dL


 


Serum Pregnancy, Qual  NEGATIVE    (Negative)  














  02/17/21 Range/Units





  13:20 


 


WBC  5.5  (4.0-10.5)  K/mm3


 


RBC  3.34 L  (4.1-5.4)  M/mm3


 


Hgb  10.4 L  (12.0-16.0)  gm/dl


 


Hct  34.9 L  (35-47)  %


 


MCV  104.5 H  ()  fl


 


MCH  31.1  (26-32)  pg


 


MCHC  29.8 L  (32-36)  g/dl


 


RDW  13.5  (11.5-14.0)  %


 


Plt Count  273  (150-450)  K/mm3


 


MPV  10.3  (7.5-11.0)  fl


 


Gran %  75.5 H  (36.0-66.0)  %


 


Eos # (Auto)  0.19  (0-0.5)  


 


Absolute Lymphs (auto)  0.85 L  (1.0-4.6)  


 


Absolute Monos (auto)  0.29  (0.0-1.3)  


 


Lymphocytes %  15.5 L  (24.0-44.0)  %


 


Monocytes %  5.3  (0.0-12.0)  %


 


Eosinophils %  3.5  (0.00-5.0)  %


 


Basophils %  0.2  (0.0-0.4)  %


 


Absolute Granulocytes  4.15  (1.4-6.9)  


 


Basophils #  0.01  (0-0.4)  


 


Sodium   (137-145)  mmol/L


 


Potassium   (3.5-5.1)  mmol/L


 


Chloride   ()  mmol/L


 


Carbon Dioxide   (22-30)  mmol/L


 


Anion Gap   (5-15)  MEQ/L


 


BUN   (7-17)  mg/dL


 


Creatinine   (0.52-1.04)  mg/dL


 


Estimated GFR   ML/MIN


 


Glucose   ()  mg/dL


 


Lactic Acid   (0.4-2.0)  


 


Calcium   (8.4-10.2)  mg/dL


 


Total Bilirubin   (0.2-1.3)  mg/dL


 


AST   (14-36)  U/L


 


ALT   (0-35)  U/L


 


Alkaline Phosphatase   ()  U/L


 


Serum Total Protein   (6.3-8.2)  g/dL


 


Albumin   (3.5-5.0)  g/dL


 


Serum Pregnancy, Qual   (Negative)  














- Progress


Progress: improved, re-examined


Progress Note: 





02/17/21 15:32


30 years old female with end-stage renal disease on dialysis is evaluated for 

left pelvic pain.  She is given symptomatic treatment for pain, on reevaluation 

feeling better.  She has normal white count, I chemistries consistent with ESRD 

and is due for dialysis tomorrow.  She has a normal lactate.  I did not 

appreciate any signs of infection at PD catheter removal site.  She does have 

appointment with Dr. Carrasco tomorrow for reevaluation.  Pain is similar to 

previous when she had a ovarian cyst and I have repeated ultrasound which showed

 complex cyst left ovary grossly unchanged and no free fluid.  She does not have

 any other peritoneal signs.  I have advised her to follow-up with OB/GYN for 

reevaluation as her pain seems to be secondary to this complex cyst.  At this 

point I do not think she needs any further work-up and is stable for discharge 

with outpatient follow-up.  She does have pain medications at home which she is 

advised to take


Counseled pt/family regarding: lab results, diagnosis, need for follow-up, rad 

results





- Departure


Departure Disposition: Home


Clinical Impression: 


 Complex cyst of left ovary





Condition: Stable


Critical Care Time: No


Referrals: 


JIGNA THOMPSON MD [Primary Care Provider] -  (1-2 days for reevaluation)


INDIA VILLA DO [ACTIVE STAFF] -  (1-2 days for reevaluation)


Instructions:  Acute Abdomen (Belly Pain), Adult (DC), Ovarian Cyst (DC)


Additional Instructions: 


Take pain medications as needed.  Follow-up with GYN for reevaluation.  Return 

to ER for intractable pain/vomiting/fever chills etc.  Keep appointment with Dr. Carrasco for tomorrow.  Keep your dialysis appointment.

## 2021-07-08 ENCOUNTER — HOSPITAL ENCOUNTER (EMERGENCY)
Dept: HOSPITAL 33 - ED | Age: 31
Discharge: HOME | End: 2021-07-08
Payer: MEDICARE

## 2021-07-08 VITALS — HEART RATE: 78 BPM | OXYGEN SATURATION: 98 %

## 2021-07-08 VITALS — DIASTOLIC BLOOD PRESSURE: 78 MMHG | SYSTOLIC BLOOD PRESSURE: 93 MMHG

## 2021-07-08 DIAGNOSIS — Z99.2: ICD-10-CM

## 2021-07-08 DIAGNOSIS — Z79.899: ICD-10-CM

## 2021-07-08 DIAGNOSIS — I10: ICD-10-CM

## 2021-07-08 DIAGNOSIS — E03.9: ICD-10-CM

## 2021-07-08 DIAGNOSIS — N18.6: ICD-10-CM

## 2021-07-08 DIAGNOSIS — R07.89: Primary | ICD-10-CM

## 2021-07-08 DIAGNOSIS — Z79.891: ICD-10-CM

## 2021-07-08 LAB
ALBUMIN SERPL-MCNC: 5 G/DL (ref 3.5–5)
ALP SERPL-CCNC: 561 U/L (ref 38–126)
ALT SERPL-CCNC: 13 U/L (ref 0–35)
ANION GAP SERPL CALC-SCNC: 17 MEQ/L (ref 5–15)
AST SERPL QL: 29 U/L (ref 14–36)
BASOPHILS # BLD AUTO: 0.03 10*3/UL (ref 0–0.4)
BASOPHILS NFR BLD AUTO: 0.3 % (ref 0–0.4)
BILIRUB BLD-MCNC: 0.5 MG/DL (ref 0.2–1.3)
BUN SERPL-MCNC: 16 MG/DL (ref 7–17)
CALCIUM SPEC-MCNC: 9.9 MG/DL (ref 8.4–10.2)
CHLORIDE SERPL-SCNC: 92 MMOL/L (ref 98–107)
CO2 SERPL-SCNC: 33 MMOL/L (ref 22–30)
CREAT SERPL-MCNC: 3.85 MG/DL (ref 0.52–1.04)
EOSINOPHIL # BLD AUTO: 0.23 10*3/UL (ref 0–0.5)
GFR SERPLBLD BASED ON 1.73 SQ M-ARVRAT: 14.6 ML/MIN
GLUCOSE SERPL-MCNC: 89 MG/DL (ref 74–106)
HCT VFR BLD AUTO: 38.1 % (ref 35–47)
HGB BLD-MCNC: 11.4 GM/DL (ref 12–16)
LYMPHOCYTES # SPEC AUTO: 1.45 10*3/UL (ref 1–4.6)
MCH RBC QN AUTO: 32.6 PG (ref 26–32)
MCHC RBC AUTO-ENTMCNC: 29.9 G/DL (ref 32–36)
MONOCYTES # BLD AUTO: 0.53 10*3/UL (ref 0–1.3)
PLATELET # BLD AUTO: 298 K/MM3 (ref 150–450)
POTASSIUM SERPLBLD-SCNC: 3.9 MMOL/L (ref 3.5–5.1)
PROT SERPL-MCNC: 8.9 G/DL (ref 6.3–8.2)
RBC # BLD AUTO: 3.5 M/MM3 (ref 4.1–5.4)
SODIUM SERPL-SCNC: 138 MMOL/L (ref 137–145)
WBC # BLD AUTO: 12 K/MM3 (ref 4–10.5)

## 2021-07-08 PROCEDURE — 84484 ASSAY OF TROPONIN QUANT: CPT

## 2021-07-08 PROCEDURE — 71045 X-RAY EXAM CHEST 1 VIEW: CPT

## 2021-07-08 PROCEDURE — 36000 PLACE NEEDLE IN VEIN: CPT

## 2021-07-08 PROCEDURE — 93005 ELECTROCARDIOGRAM TRACING: CPT

## 2021-07-08 PROCEDURE — 81025 URINE PREGNANCY TEST: CPT

## 2021-07-08 PROCEDURE — 83880 ASSAY OF NATRIURETIC PEPTIDE: CPT

## 2021-07-08 PROCEDURE — 36415 COLL VENOUS BLD VENIPUNCTURE: CPT

## 2021-07-08 PROCEDURE — 99284 EMERGENCY DEPT VISIT MOD MDM: CPT

## 2021-07-08 PROCEDURE — 93041 RHYTHM ECG TRACING: CPT

## 2021-07-08 PROCEDURE — 85025 COMPLETE CBC W/AUTO DIFF WBC: CPT

## 2021-07-08 PROCEDURE — 80053 COMPREHEN METABOLIC PANEL: CPT

## 2021-07-08 NOTE — ERPHSYRPT
- History of Present Illness


Time Seen by Provider: 07/08/21 09:59


Historian: patient


Exam Limitations: no limitations


Patient Subjective Stated Complaint: pt here for chest pain to [facility] chest 

since 2200 off and on, she states is on dialysis and had it 3 days in a row and 

now ha chest pain


Triage Nursing Assessment: pt alert, resp easy , face mask in place, resp easy, 

skin w/d/p.


Physician History: 





31 years old female with end-stage renal disease on dialysis who had fluid 

overload and has been going for dialysis 3rd consecutive day because of fluid 

overload and has almost 14 L removal done in 3 days presented in the ER with 

intermittent substernal dull aching mild to moderate intensity chest pain 

without any significant aggravating or relieving factors.  Denies any associated

shortness of breath but what she has at her baseline.  Patient reports having 

similar symptoms in the past with consecutive dialysis but chest pain usually 

improves the next day but it was not going away at that is why she decided to 

report in the ER.  Denies any fever chills or cough.


Timing/Duration: yesterday, intermittent, gradual onset


Activities at Onset: rest


Quality: aching, dullness


Location: substernal


Chest Pain Radiation: no radiation


Severity of Pain-Max: moderate


Severity of Pain-Current: mild


Modifying Factors: Improves With: nothing


Associated Symptoms: denies symptoms


Prior Chest Pain/Cardiac Workup: non-cardiac


Nitro Today/Relief: no nitro taken today


Aspirin Treatment Today: no aspirin today


Allergies/Adverse Reactions: 








heparin Allergy (Severe, Verified 07/08/21 09:46)


   antibodies


hydromorphone [From Dilaudid] Allergy (Verified 07/08/21 09:46)


   


naproxen Allergy (Verified 07/08/21 09:46)


   


sulfamethoxazole [From Bactrim] Allergy (Verified 07/08/21 09:46)


   


trimethoprim [From Bactrim] Allergy (Verified 07/08/21 09:46)


   


vancomycin Allergy (Verified 07/08/21 09:46)


   





Home Medications: 








Alprazolam [Xanax] 0.5 mg PO BID 01/12/15 [History]


Fluvoxamine Maleate 100 mg PO BID 01/12/15 [History]


Levothyroxine Sodium 100 Mcg** [Synthroid 100 Mcg***] 137 mcg PO DAILY 01/12/15 

[History]


Lithium Carbonate 300 mg*** [Lithium Carbonate 300 MG***] 150 mg PO BID 01/12/15

[History]


Ferric Citrate [Auryxia] 420 mg PO UD 07/01/18 [History]


Ferric Citrate [Auryxia] 630 mg PO AC 07/01/18 [History]


Hydrocodone/Acetaminophen [Norco 5-325 Tablet] 1 each PO BID PRN PRN 07/01/18 

[History]


calcitrioL [Rocaltrol] 0.5 mcg PO DAILY 07/01/18 [History]


Albuterol Sulfate [Proair Hfa] 8.5 gm IH Q4H PRN PRN 06/11/19 [History]


Pantoprazole Sodium [Protonix] 40 mg PO DAILY 09/15/19 [History]


Carvedilol 12.5 mg*** [Coreg 12.5 mg***] 12.5 mg PO DAILY 12/26/20 [History]





Hx Tetanus, Diphtheria Vaccination/Date Given: Yes


Hx Influenza Vaccination/Date Given: No


Hx Pneumococcal Vaccination/Date Given: No


Immunizations Up to Date: Yes





Travel Risk





- International Travel


Have you traveled outside of the country in past 3 weeks: No





- Coronavirus Screening


Are you exhibiting any of the following symptoms?: No





- Vaccine Status


Have you recieved a Covid-19 vaccination: No





- Review of Systems


Eyes: No Symptoms


Ears, Nose, & Throat: No Symptoms


Respiratory: Dyspnea


Cardiac: Chest Pain


Abdominal/Gastrointestinal: No Symptoms


Genitourinary Symptoms: No Symptoms


Musculoskeletal: No Symptoms


Neurological: No Symptoms


Psychological: Anxiety


Endocrine: No Symptoms


Hematologic/Lymphatic: No Symptoms


Immunological/Allergic: No Symptoms





- Past Medical History


Pertinent Past Medical History: Yes


Neurological History: No Pertinent History


ENT History: No Pertinent History


Cardiac History: Hypertension, Other


Respiratory History: No Pertinent History


Endocrine Medical History: Hypothyroidism, Other


Musculoskeletal History: Fibromyalgia, Rheumatoid Arthritis


GI Medical History: No Pertinent History


 History: Renal Disease


Psycho-Social History: Anxiety, Attention Deficit Disorder, Bipolar, Depression


Female Reproductive Disorders: No Pertinent History


Other Medical History: ANXIETY, DEPRESSION, BIPOLAR, OCD.  LEFT FISTULAR UPPER 

ARM FOR DIALYSIS.  MONDAY, WEDNESDAY, AND FRIDAY.





- Past Surgical History


Past Surgical History: Yes


Neuro Surgical History: No Pertinent History


Cardiac: Cardiac Catheterization


Respiratory: No Pertinent History


Gastrointestinal: No Pertinent History


Genitourinary: Other


Musculoskeletal: No Pertinent History


Female Surgical History: No Pertinent History


Other Surgical History: fistula placment left arm (does not work), dialysis cath

rt subclavian, mirena placement, kidney biopsy.  aneurysm in left arm.  pd cath

eter removed





- Social History


Smoking Status: Former smoker


How long have you smoked: 3


Exposure to second hand smoke: Yes


Drug Use: none


Patient Lives Alone: No





- Female History


Hx Last Menstrual Period: unsure


Hx Pregnant Now:  (unkn)





- Nursing Vital Signs


Nursing Vital Signs: 


                               Initial Vital Signs











Temperature  96.7 F   07/08/21 09:39


 


Pulse Rate  108 H  07/08/21 09:39


 


Respiratory Rate  16   07/08/21 09:39


 


Blood Pressure  93/74   07/08/21 09:39


 


O2 Sat by Pulse Oximetry  96   07/08/21 09:39








                                   Pain Scale











Pain Intensity                 2

















- Physical Exam


General Appearance: no apparent distress, alert, anxiety


Eye Exam: PERRL/EOMI, eyes nml inspection


Ears, Nose, Throat Exam: normal ENT inspection, TMs normal, pharynx normal


Neck Exam: normal inspection, non-tender, full range of motion, midline 

tenderness


Respiratory Exam: normal breath sounds, lungs clear


Cardiovascular Exam: regular rate/rhythm, normal heart sounds


Gastrointestinal/Abdomen Exam: soft, normal bowel sounds, No tenderness


Back Exam: normal inspection


Extremity Exam: normal range of motion, pelvis stable


Neurologic Exam: alert, oriented x 3, cooperative


Skin Exam: normal color


**SpO2 Interpretation**: normal


SpO2: 96


O2 Delivery: Room Air





- Course


EKG Interpreted by Me: RATE (98), NORMAL AXIS, Q-wave (Inferior), Non-specific 

ST Changes, Other (T wave inversion in lateral leads)


Ordered Tests: 


                               Active Orders 24 hr











 Category Date Time Status


 


 Cardiac Monitor STAT Care  07/08/21 10:00 Active


 


 EKG-ER Only STAT Care  07/08/21 10:00 Active


 


 IV Insertion STAT Care  07/08/21 10:00 Active


 


 CHEST 1 VIEW (PORTABLE) Stat Exams  07/08/21 10:00 Completed


 


 CBC W DIFF Stat Lab  07/08/21 10:05 Completed


 


 CMP Stat Lab  07/08/21 10:05 Completed


 


 HCG QUALITATIVE,SERUM Stat Lab  07/08/21 10:05 Completed


 


 NT PRO BNP Stat Lab  07/08/21 10:05 Completed


 


 TROPONIN Q3H Lab  07/08/21 10:05 Completed


 


 TROPONIN Q3H Lab  07/08/21 13:30 Received


 


 TROPONIN Q3H Lab  07/08/21 16:00 Ordered


 


 TROPONIN Q3H Lab  07/08/21 19:00 Ordered


 


 TROPONIN Q3H Lab  07/08/21 22:00 Ordered








Medication Summary














Discontinued Medications














Generic Name Dose Route Start Last Admin





  Trade Name Roryq  PRN Reason Stop Dose Admin


 


Aspirin  324 mg  07/08/21 10:00  07/08/21 10:02





  Baby Aspirin 81 Mg Chew***  PO  07/08/21 10:01  324 mg





  STAT ONE   Administration











Lab/Rad Data: 


                           Laboratory Result Diagrams





                                 07/08/21 10:05 





                                 07/08/21 10:05 





                               Laboratory Results











  07/08/21 07/08/21 07/08/21 Range/Units





  10:05 10:05 10:05 


 


WBC    12.0 H  (4.0-10.5)  K/mm3


 


RBC    3.50 L  (4.1-5.4)  M/mm3


 


Hgb    11.4 L  (12.0-16.0)  gm/dl


 


Hct    38.1  (35-47)  %


 


MCV    108.9 H  ()  fl


 


MCH    32.6 H  (26-32)  pg


 


MCHC    29.9 L  (32-36)  g/dl


 


RDW    14.4 H  (11.5-14.0)  %


 


Plt Count    298  (150-450)  K/mm3


 


MPV    9.7  (7.5-11.0)  fl


 


Gran %    81.3 H  (36.0-66.0)  %


 


Eos # (Auto)    0.23  (0-0.5)  


 


Absolute Lymphs (auto)    1.45  (1.0-4.6)  


 


Absolute Monos (auto)    0.53  (0.0-1.3)  


 


Lymphocytes %    12.1 L  (24.0-44.0)  %


 


Monocytes %    4.4  (0.0-12.0)  %


 


Eosinophils %    1.9  (0.00-5.0)  %


 


Basophils %    0.3  (0.0-0.4)  %


 


Absolute Granulocytes    9.72 H  (1.4-6.9)  


 


Basophils #    0.03  (0-0.4)  


 


Sodium   138   (137-145)  mmol/L


 


Potassium   3.9   (3.5-5.1)  mmol/L


 


Chloride   92 L   ()  mmol/L


 


Carbon Dioxide   33 H   (22-30)  mmol/L


 


Anion Gap   17.0 H   (5-15)  MEQ/L


 


BUN   16   (7-17)  mg/dL


 


Creatinine   3.85 H   (0.52-1.04)  mg/dL


 


Estimated GFR   14.6   ML/MIN


 


Glucose   89   ()  mg/dL


 


Calcium   9.9   (8.4-10.2)  mg/dL


 


Total Bilirubin   0.50   (0.2-1.3)  mg/dL


 


AST   29   (14-36)  U/L


 


ALT   13   (0-35)  U/L


 


Alkaline Phosphatase   561 H   ()  U/L


 


Troponin I     (0.000-0.034)  ng/mL


 


NT-Pro-B Natriuret Pep   05681 H   (0-450)  pg/mL


 


Serum Total Protein   8.9 H   (6.3-8.2)  g/dL


 


Albumin   5.0   (3.5-5.0)  g/dL


 


Serum Pregnancy, Qual  NEGATIVE    (Negative)  














  07/08/21 Range/Units





  10:05 


 


WBC   (4.0-10.5)  K/mm3


 


RBC   (4.1-5.4)  M/mm3


 


Hgb   (12.0-16.0)  gm/dl


 


Hct   (35-47)  %


 


MCV   ()  fl


 


MCH   (26-32)  pg


 


MCHC   (32-36)  g/dl


 


RDW   (11.5-14.0)  %


 


Plt Count   (150-450)  K/mm3


 


MPV   (7.5-11.0)  fl


 


Gran %   (36.0-66.0)  %


 


Eos # (Auto)   (0-0.5)  


 


Absolute Lymphs (auto)   (1.0-4.6)  


 


Absolute Monos (auto)   (0.0-1.3)  


 


Lymphocytes %   (24.0-44.0)  %


 


Monocytes %   (0.0-12.0)  %


 


Eosinophils %   (0.00-5.0)  %


 


Basophils %   (0.0-0.4)  %


 


Absolute Granulocytes   (1.4-6.9)  


 


Basophils #   (0-0.4)  


 


Sodium   (137-145)  mmol/L


 


Potassium   (3.5-5.1)  mmol/L


 


Chloride   ()  mmol/L


 


Carbon Dioxide   (22-30)  mmol/L


 


Anion Gap   (5-15)  MEQ/L


 


BUN   (7-17)  mg/dL


 


Creatinine   (0.52-1.04)  mg/dL


 


Estimated GFR   ML/MIN


 


Glucose   ()  mg/dL


 


Calcium   (8.4-10.2)  mg/dL


 


Total Bilirubin   (0.2-1.3)  mg/dL


 


AST   (14-36)  U/L


 


ALT   (0-35)  U/L


 


Alkaline Phosphatase   ()  U/L


 


Troponin I  < 0.012  (0.000-0.034)  ng/mL


 


NT-Pro-B Natriuret Pep   (0-450)  pg/mL


 


Serum Total Protein   (6.3-8.2)  g/dL


 


Albumin   (3.5-5.0)  g/dL


 


Serum Pregnancy, Qual   (Negative)  














- Progress


Progress: improved


Air Movement: good


Progress Note: 





07/08/21 15:01


31 years old on dialysis with third consecutive day today is evaluated for chest

pain intermittent since yesterday.  She is given aspirin, refused any other pain

medications.  EKG did not show any obvious ST elevation and negative troponins 

x2.  Chest x-ray negative for any acute cardiopulmonary findings.  Chemistries 

consistent with ESRD.  Patient chest pain improved on reevaluation.  I believe 

patient has more than 13 L removal done in the last 3 days and have some strain 

causing pain.  Recommended supportive care and outpatient primary care/car

diology follow-up.  Discussed signs symptoms of worsening needing return to ER 

which he seems understanding.  Stable for discharge.


Blood Culture(s) Obtained: No


Antibiotics given: No


Counseled pt/family regarding: lab results, diagnosis, need for follow-up, rad 

results





- Departure


Departure Disposition: Home


Clinical Impression: 


 Atypical chest pain





Condition: Stable


Critical Care Time: No


Referrals: 


JIGNA THOMPSON MD [ACTIVE STAFF] -  (1-2 days for reevaluation)


Instructions:  Angina (DC), Chest Pain (DC)


Additional Instructions: 


Take Tylenol as needed for pain.  Follow-up with primary care and cardiology for

reevaluation.  Return to ER for worsening chest pain or if develop 

palpitations/shortness of breath etc.

## 2021-07-08 NOTE — XRAY
Indication: Chest pain.



Comparison: September 21, 2020.



Portable chest remains clear.  Heart not enlarged.  Bony thorax intact.  Again

partially visualized left upper arm stent graft.



Impression: Continued nonacute chest.

## 2021-08-02 ENCOUNTER — HOSPITAL ENCOUNTER (EMERGENCY)
Dept: HOSPITAL 33 - ED | Age: 31
LOS: 1 days | Discharge: ANOTHER HOSPITAL | End: 2021-08-03
Payer: MEDICARE

## 2021-08-02 VITALS — OXYGEN SATURATION: 99 %

## 2021-08-02 DIAGNOSIS — D53.9: ICD-10-CM

## 2021-08-02 DIAGNOSIS — Z99.2: ICD-10-CM

## 2021-08-02 DIAGNOSIS — N83.201: ICD-10-CM

## 2021-08-02 DIAGNOSIS — N18.6: ICD-10-CM

## 2021-08-02 DIAGNOSIS — I12.0: ICD-10-CM

## 2021-08-02 DIAGNOSIS — Z79.899: ICD-10-CM

## 2021-08-02 DIAGNOSIS — R10.32: Primary | ICD-10-CM

## 2021-08-02 DIAGNOSIS — D72.829: ICD-10-CM

## 2021-08-02 DIAGNOSIS — E03.9: ICD-10-CM

## 2021-08-02 DIAGNOSIS — M43.8X9: ICD-10-CM

## 2021-08-02 LAB
ALBUMIN SERPL-MCNC: 4.2 G/DL (ref 3.5–5)
ALP SERPL-CCNC: 402 U/L (ref 38–126)
ALT SERPL-CCNC: 12 U/L (ref 0–35)
ANION GAP SERPL CALC-SCNC: 23 MEQ/L (ref 5–15)
AST SERPL QL: 21 U/L (ref 14–36)
BASOPHILS # BLD AUTO: 0.02 10*3/UL (ref 0–0.4)
BASOPHILS NFR BLD AUTO: 0.1 % (ref 0–0.4)
BILIRUB BLD-MCNC: 0.2 MG/DL (ref 0.2–1.3)
BUN SERPL-MCNC: 77 MG/DL (ref 7–17)
CALCIUM SPEC-MCNC: 8.5 MG/DL (ref 8.4–10.2)
CHLORIDE SERPL-SCNC: 93 MMOL/L (ref 98–107)
CO2 SERPL-SCNC: 23 MMOL/L (ref 22–30)
CREAT SERPL-MCNC: 11.77 MG/DL (ref 0.52–1.04)
EOSINOPHIL # BLD AUTO: 0.15 10*3/UL (ref 0–0.5)
GFR SERPLBLD BASED ON 1.73 SQ M-ARVRAT: 4 ML/MIN
GLUCOSE SERPL-MCNC: 92 MG/DL (ref 74–106)
HCT VFR BLD AUTO: 29.2 % (ref 35–47)
HGB BLD-MCNC: 9 GM/DL (ref 12–16)
LYMPHOCYTES # SPEC AUTO: 1.21 10*3/UL (ref 1–4.6)
MCH RBC QN AUTO: 32.1 PG (ref 26–32)
MCHC RBC AUTO-ENTMCNC: 30.8 G/DL (ref 32–36)
MONOCYTES # BLD AUTO: 0.48 10*3/UL (ref 0–1.3)
PLATELET # BLD AUTO: 239 K/MM3 (ref 150–450)
POTASSIUM SERPLBLD-SCNC: 4.8 MMOL/L (ref 3.5–5.1)
PROT SERPL-MCNC: 7.3 G/DL (ref 6.3–8.2)
RBC # BLD AUTO: 2.8 M/MM3 (ref 4.1–5.4)
SODIUM SERPL-SCNC: 135 MMOL/L (ref 137–145)
WBC # BLD AUTO: 13.4 K/MM3 (ref 4–10.5)

## 2021-08-02 PROCEDURE — 96374 THER/PROPH/DIAG INJ IV PUSH: CPT

## 2021-08-02 PROCEDURE — 76830 TRANSVAGINAL US NON-OB: CPT

## 2021-08-02 PROCEDURE — 36000 PLACE NEEDLE IN VEIN: CPT

## 2021-08-02 PROCEDURE — 80053 COMPREHEN METABOLIC PANEL: CPT

## 2021-08-02 PROCEDURE — 96376 TX/PRO/DX INJ SAME DRUG ADON: CPT

## 2021-08-02 PROCEDURE — 85025 COMPLETE CBC W/AUTO DIFF WBC: CPT

## 2021-08-02 PROCEDURE — 36415 COLL VENOUS BLD VENIPUNCTURE: CPT

## 2021-08-02 PROCEDURE — 99285 EMERGENCY DEPT VISIT HI MDM: CPT

## 2021-08-02 PROCEDURE — 84484 ASSAY OF TROPONIN QUANT: CPT

## 2021-08-02 PROCEDURE — 74176 CT ABD & PELVIS W/O CONTRAST: CPT

## 2021-08-02 RX ADMIN — FENTANYL CITRATE ONE MCG: 50 INJECTION, SOLUTION INTRAMUSCULAR; INTRAVENOUS at 22:56

## 2021-08-02 NOTE — ERPHSYRPT
- History of Present Illness


Time Seen by Provider: 08/02/21 21:00


Patient Subjective Stated Complaint: pt states, "I've got an ovarian cyst that 

is acting up and giving me lots of pain">


Triage Nursing Assessment: pt c/o Lt lower abd pain, which comes around from 

back to lower abd area.  Pt thinks its an ovarian cyst causing her pain on the 

right side which she's had for awhile, but the pain today is on the left side.  

Abd obese, soft with active bs x4 quad, nontender on palpation.


Physician History: 


Patient is a 31-year-old female presents to our ED with complaints of left lower

quadrant pain.  Pain started today.  Pain described as an ache that tends to 

radiate to her back.  Patient thinks it may be her ovarian cyst.  Patient has a 

history of ovarian cyst that tend to cause pain.  However patient's ovarian 

cysts occurs on the right side.  No trauma no fever.  Patient has a history of 

end-stage renal disease on hemodialysis.  Dialysis is performed on Tuesday Thursday and Saturday.  Patient does not produce urine.  Patient otherwise 

voices no other complaints concerns.





Timing/Duration: today


Activities at Onset: none


Quality: aching


Abdominal Pain Onset Location: LLQ


Pain Radiation: back


Severity of Pain-Max: moderate


Severity of Pain-Current: mild


Modifying Factors: Improves With: nothing


Associated Symptoms: denies symptoms, No chest pain, No diaphoresis, No 

diarrhea, No fever/chills, No fatigue, No headache, No heartburn, No nausea, No 

neck pain, No shortness of breath, No syncope, No vomiting


Previous symptoms: no prior history


Allergies/Adverse Reactions: 








heparin Allergy (Severe, Verified 08/02/21 21:11)


   antibodies


hydromorphone [From Dilaudid] Allergy (Verified 08/02/21 21:11)


   


naproxen Allergy (Verified 08/02/21 21:11)


   


sulfamethoxazole [From Bactrim] Allergy (Verified 08/02/21 21:11)


   


trimethoprim [From Bactrim] Allergy (Verified 08/02/21 21:11)


   


vancomycin Allergy (Verified 08/02/21 21:11)


   





Home Medications: 








Alprazolam [Xanax] 0.5 mg PO BID 01/12/15 [History]


Fluvoxamine Maleate 100 mg PO BID 01/12/15 [History]


Levothyroxine Sodium 100 Mcg** [Synthroid 100 Mcg***] 137 mcg PO DAILY 01/12/15 

[History]


Lithium Carbonate 300 mg*** [Lithium Carbonate 300 MG***] 150 mg PO BID 01/12/15

[History]


Ferric Citrate [Auryxia] 420 mg PO UD 07/01/18 [History]


Ferric Citrate [Auryxia] 630 mg PO AC 07/01/18 [History]


Hydrocodone/Acetaminophen [Norco 5-325 Tablet] 1 each PO BID PRN PRN 07/01/18 

[History]


calcitrioL [Rocaltrol] 0.5 mcg PO DAILY 07/01/18 [History]


Albuterol Sulfate [Proair Hfa] 8.5 gm IH Q4H PRN PRN 06/11/19 [History]


Pantoprazole Sodium [Protonix] 40 mg PO DAILY 09/15/19 [History]


Carvedilol 12.5 mg*** [Coreg 12.5 mg***] 12.5 mg PO DAILY 12/26/20 [History]





Hx Tetanus, Diphtheria Vaccination/Date Given: Yes


Hx Influenza Vaccination/Date Given: Yes


Hx Pneumococcal Vaccination/Date Given: No


Immunizations Up to Date: Yes





Travel Risk





- International Travel


Have you traveled outside of the country in past 3 weeks: No





- Coronavirus Screening


Are you exhibiting any of the following symptoms?: No


Close contact with a COVID-19 positive Pt in past 14-21 Days: No





- Vaccine Status


Have you recieved a Covid-19 vaccination: No





- Review of Systems


Constitutional: No Symptoms, No Fever, No Chills


Eyes: No Symptoms


Ears, Nose, & Throat: No Symptoms


Respiratory: No Symptoms, No Cough, No Dyspnea


Cardiac: No Symptoms, No Chest Pain, No Edema, No Syncope


Abdominal/Gastrointestinal: No Symptoms, No Abdominal Pain, No Nausea, No 

Vomiting, No Diarrhea


Genitourinary Symptoms: No Symptoms, No Dysuria


Musculoskeletal: No Symptoms, No Back Pain, No Neck Pain


Skin: No Symptoms, No Rash


Neurological: No Symptoms, No Dizziness, No Focal Weakness, No Sensory Changes


Psychological: No Symptoms


Endocrine: No Symptoms


Hematologic/Lymphatic: No Symptoms


Immunological/Allergic: No Symptoms


All Other Systems: Reviewed and Negative





- Past Medical History


Pertinent Past Medical History: Yes


Neurological History: No Pertinent History


ENT History: No Pertinent History


Cardiac History: Hypertension, Other


Respiratory History: No Pertinent History


Endocrine Medical History: Hypothyroidism, Other


Musculoskeletal History: Fibromyalgia, Rheumatoid Arthritis


GI Medical History: No Pertinent History


 History: Renal Disease


Psycho-Social History: Anxiety, Attention Deficit Disorder, Bipolar, Depression,

 Other


Female Reproductive Disorders: No Pertinent History


Other Medical History: ANXIETY, DEPRESSION, BIPOLAR, OCD.  LEFT FISTULAR UPPER 

ARM FOR DIALYSIS.  Tuesday, Thursday and Saturday





- Past Surgical History


Past Surgical History: Yes


Neuro Surgical History: No Pertinent History


Cardiac: Cardiac Catheterization


Respiratory: No Pertinent History


Gastrointestinal: No Pertinent History


Genitourinary: Other


Musculoskeletal: No Pertinent History


Female Surgical History: No Pertinent History


Other Surgical History: fistula placment left arm lower (does not work), 

dialysis cath rt subclavian, mirena placement, kidney biopsy.  aneurysm in left 

arm.  pd catheter removed.  fistula left arm upper is the one they use





- Social History


Smoking Status: Former smoker


How long have you smoked: 3


Exposure to second hand smoke: Yes


Drug Use: none


Patient Lives Alone: No





- Female History


Hx Pregnant Now: No





- Nursing Vital Signs


Nursing Vital Signs: 


                               Initial Vital Signs











Temperature  98.2 F   08/02/21 20:59


 


Pulse Rate  88   08/02/21 20:59


 


Respiratory Rate  18   08/02/21 20:59


 


Blood Pressure  140/80   08/02/21 20:59


 


O2 Sat by Pulse Oximetry  99   08/02/21 20:59








                                   Pain Scale











Pain Intensity                 10

















- Physical Exam


General Appearance: no apparent distress, alert


Eye Exam: PERRL/EOMI, eyes nml inspection


Ears, Nose, Throat Exam: normal ENT inspection, pharynx normal, moist mucous 

membranes


Neck Exam: normal inspection, non-tender, supple, full range of motion


Respiratory Exam: normal breath sounds, lungs clear, No respiratory distress


Cardiovascular Exam: regular rate/rhythm, normal heart sounds


Gastrointestinal/Abdomen Exam: soft, other (Left lower quadrant tenderness.  No 

pelvic pain.  Pelvic tenderness.  Overlying soft tissue intact.  No signs of 

trauma.), No tenderness, No mass


Back Exam: normal inspection, normal range of motion, No CVA tenderness, No 

vertebral tenderness


Extremity Exam: normal inspection, normal range of motion, pelvis stable


Neurologic Exam: alert, oriented x 3, cooperative, normal mood/affect, nml 

cerebellar function, sensation nml, No motor deficits


Skin Exam: normal color, warm, dry


**SpO2 Interpretation**: normal


SpO2: 99


O2 Delivery: Room Air





- Course


Nursing assessment & vital signs reviewed: Yes





- CT Exams


  ** Abdomen/Pelvis


CT Interpretation: Tele-radiologist Report (The larger than before left adnexal 

lesion, which is better characterized with ultrasound examination from 

2/17/2021.  Due to the growing nature of this complex cyst repeat ultrasound is 

recommended.  Persistent severe atrophy of bilateral renal cortices.  Findings 

compatible with rugger jersey spin)





- Radiology Ultrasound Exam


  ** Pelvis


Ultrasound: discussed w/radiologist (Per discussion with ultrasonographer there 

is no torsion.  There is a complex cyst.)


Ordered Tests: 


                               Active Orders 24 hr











 Category Date Time Status


 


 IV Insertion STAT Care  08/02/21 21:40 Active


 


 ABDOMEN AND PELVIS W/0 CONTRAS [CT] Stat Exams  08/02/21 21:40 Taken


 


 PELVIS TRANS VAGINAL [US] Stat Exams  08/02/21 23:21 Taken


 


 CBC W DIFF Stat Lab  08/02/21 21:43 Completed


 


 CMP Stat Lab  08/02/21 21:43 Completed


 


 TROPONIN Q3H Lab  08/02/21 21:43 Completed


 


 TROPONIN Q3H Lab  08/03/21 00:20 Completed


 


 TROPONIN Q3H Lab  08/03/21 03:45 Ordered


 


 TROPONIN Q3H Lab  08/03/21 06:45 Ordered


 


 TROPONIN Q3H Lab  08/03/21 09:45 Ordered








Medication Summary














Discontinued Medications














Generic Name Dose Route Start Last Admin





  Trade Name Ale  PRN Reason Stop Dose Admin


 


Fentanyl Citrate  25 mcg  08/02/21 22:51  08/02/21 22:56





  Sublimaze 100 Mcg/2 Ml***  IV  08/02/21 22:52  25 mcg





  STAT ONE   Administration


 


Fentanyl Citrate  Confirm  08/02/21 22:53 





  Sublimaze 100 Mcg/2 Ml***  Administered  08/02/21 22:54 





  Dose  





  100 mcg  





  .ROUTE  





  .STK-MED ONE  


 


Fentanyl Citrate  25 mcg  08/03/21 01:16  08/03/21 01:22





  Sublimaze 100 Mcg/2 Ml***  IV  08/03/21 01:17  25 mcg





  STAT ONE   Administration


 


Fentanyl Citrate  Confirm  08/03/21 01:20 





  Sublimaze 100 Mcg/2 Ml***  Administered  08/03/21 01:21 





  Dose  





  100 mcg  





  .ROUTE  





  .STK-MED ONE  


 


Fentanyl Citrate  25 mcg  08/03/21 02:39  08/03/21 02:49





  Sublimaze 100 Mcg/2 Ml***  IV  08/03/21 02:40  25 mcg





  STAT ONE   Administration


 


Fentanyl Citrate  Confirm  08/03/21 02:47 





  Sublimaze 100 Mcg/2 Ml***  Administered  08/03/21 02:48 





  Dose  





  100 mcg  





  .ROUTE  





  .STK-MED ONE  











Lab/Rad Data: 


                           Laboratory Result Diagrams





                                 08/02/21 21:43 





                                 08/02/21 21:43 





                               Laboratory Results











  08/03/21 08/02/21 08/02/21 Range/Units





  00:20 21:43 21:43 


 


WBC     (4.0-10.5)  K/mm3


 


RBC     (4.1-5.4)  M/mm3


 


Hgb     (12.0-16.0)  gm/dl


 


Hct     (35-47)  %


 


MCV     ()  fl


 


MCH     (26-32)  pg


 


MCHC     (32-36)  g/dl


 


RDW     (11.5-14.0)  %


 


Plt Count     (150-450)  K/mm3


 


MPV     (7.5-11.0)  fl


 


Gran %     (36.0-66.0)  %


 


Eos # (Auto)     (0-0.5)  


 


Absolute Lymphs (auto)     (1.0-4.6)  


 


Absolute Monos (auto)     (0.0-1.3)  


 


Lymphocytes %     (24.0-44.0)  %


 


Monocytes %     (0.0-12.0)  %


 


Eosinophils %     (0.00-5.0)  %


 


Basophils %     (0.0-0.4)  %


 


Absolute Granulocytes     (1.4-6.9)  


 


Basophils #     (0-0.4)  


 


Sodium    135 L  (137-145)  mmol/L


 


Potassium    4.8  (3.5-5.1)  mmol/L


 


Chloride    93 L  ()  mmol/L


 


Carbon Dioxide    23  (22-30)  mmol/L


 


Anion Gap    23.0 H  (5-15)  MEQ/L


 


BUN    77 H  (7-17)  mg/dL


 


Creatinine    11.77 H  (0.52-1.04)  mg/dL


 


Estimated GFR    4.0  ML/MIN


 


Glucose    92  ()  mg/dL


 


Calcium    8.5  (8.4-10.2)  mg/dL


 


Total Bilirubin    0.20  (0.2-1.3)  mg/dL


 


AST    21  (14-36)  U/L


 


ALT    12  (0-35)  U/L


 


Alkaline Phosphatase    402 H  ()  U/L


 


Troponin I  0.019  0.018   (0.000-0.034)  ng/mL


 


Serum Total Protein    7.3  (6.3-8.2)  g/dL


 


Albumin    4.2  (3.5-5.0)  g/dL














  08/02/21 Range/Units





  21:43 


 


WBC  13.4 H  (4.0-10.5)  K/mm3


 


RBC  2.80 L  (4.1-5.4)  M/mm3


 


Hgb  9.0 L  (12.0-16.0)  gm/dl


 


Hct  29.2 L  (35-47)  %


 


MCV  104.3 H  ()  fl


 


MCH  32.1 H  (26-32)  pg


 


MCHC  30.8 L  (32-36)  g/dl


 


RDW  13.5  (11.5-14.0)  %


 


Plt Count  239  (150-450)  K/mm3


 


MPV  10.2  (7.5-11.0)  fl


 


Gran %  86.2 H  (36.0-66.0)  %


 


Eos # (Auto)  0.15  (0-0.5)  


 


Absolute Lymphs (auto)  1.21  (1.0-4.6)  


 


Absolute Monos (auto)  0.48  (0.0-1.3)  


 


Lymphocytes %  9.0 L  (24.0-44.0)  %


 


Monocytes %  3.6  (0.0-12.0)  %


 


Eosinophils %  1.1  (0.00-5.0)  %


 


Basophils %  0.1  (0.0-0.4)  %


 


Absolute Granulocytes  11.54 H  (1.4-6.9)  


 


Basophils #  0.02  (0-0.4)  


 


Sodium   (137-145)  mmol/L


 


Potassium   (3.5-5.1)  mmol/L


 


Chloride   ()  mmol/L


 


Carbon Dioxide   (22-30)  mmol/L


 


Anion Gap   (5-15)  MEQ/L


 


BUN   (7-17)  mg/dL


 


Creatinine   (0.52-1.04)  mg/dL


 


Estimated GFR   ML/MIN


 


Glucose   ()  mg/dL


 


Calcium   (8.4-10.2)  mg/dL


 


Total Bilirubin   (0.2-1.3)  mg/dL


 


AST   (14-36)  U/L


 


ALT   (0-35)  U/L


 


Alkaline Phosphatase   ()  U/L


 


Troponin I   (0.000-0.034)  ng/mL


 


Serum Total Protein   (6.3-8.2)  g/dL


 


Albumin   (3.5-5.0)  g/dL














- Progress


Progress: improved


Progress Note: 


Reassessed.  Pain improved.  CT shows there is an IUD in place.  4.5 cm in 

diameter near fluid density lesion is again noted which has slightly enlarged 

since the prior study.  Additionally left adnexal lesion has also grown in size 

consistent with complex cyst.  Repeat ultrasound recommended.  


08/02/21 23:25





08/03/21 02:46


Troponin negative and stable x2.  Patient reassessed.  She continues to 

experience pain to the left lower quadrant.  We advised transfer for further 

evaluation and treatment of pain.  We are unable to keep patient here in our 

hospital as we have no beds available and do not perform hemodialysis.  Patient 

initially agreed to transfer to [facility].  Patient later notified us and 

told us that she preferred discharge.








08/03/21 03:20


Patient changed her mind.  Patient states she prefers to be admitted.  Patient 

now requesting transfer to [facility].  Case discussed with Dr. Tellez 

 ER physician at [facility] who accepts transfer.


Counseled pt/family regarding: lab results, diagnosis, need for follow-up, rad 

results





- Departure


Departure Disposition: Home


Clinical Impression: 


 Adnexal cyst, rugger jersey spine, Intractable abdominal pain, Leukocytosis, 

Macrocytic anemia, ESRD (end stage renal disease)





Condition: Stable


Critical Care Time: No


Referrals: 


DOCTOR,NO FAMILY [Primary Care Provider] -

## 2021-08-03 VITALS — SYSTOLIC BLOOD PRESSURE: 138 MMHG | HEART RATE: 101 BPM | DIASTOLIC BLOOD PRESSURE: 78 MMHG

## 2021-08-03 RX ADMIN — FENTANYL CITRATE ONE MCG: 50 INJECTION, SOLUTION INTRAMUSCULAR; INTRAVENOUS at 01:22

## 2021-08-03 RX ADMIN — FENTANYL CITRATE ONE MCG: 50 INJECTION, SOLUTION INTRAMUSCULAR; INTRAVENOUS at 02:49

## 2021-08-03 NOTE — XRAY
Indication: Torsion.



Two-dimensional transvaginal pelvic sonogram performed.



Comparison: February 17, 2021.



Uterus again anteverted measuring 6.1 x 2.8 x 3.8 cm.  Lower uterine segment

again demonstrates tiny 3 mm nabothian cyst.  No other focal solid/cystic

uterine mass.  Endometrial cavity again demonstrates a IUD with tip at the

level of fundus.  No endometrial cavity mass or fluid collection.



Right ovary measures 3.7 x 1.9 x 3.3 cm and the left measures 4.3 x 3.7 x 4.9

cm.  Normal perfusion bilaterally.  Left ovary again demonstrates a complex

echogenic cyst measuring 3.5 x 2.6 x 4.0 cm, previously 3.4 x 2.8 x 2.2 cm.

No free fluid.



Impression: Minimally enlarging left ovary complex echogenic cyst.  Again

incidental IUD and tiny nabothian cyst.

## 2021-08-03 NOTE — XRAY
Indication: Low back pain.  History ovarian cyst.  Dialysis.



Multiple contiguous axial images obtained through the abdomen and pelvis

without contrast.



Comparison: December 26, 2020.



Lung bases are clear.  Heart is borderline enlarged.



Noncontrasted stomach and bowel loops nonobstructed with normal appendix.

Enlarging 4.6 cm left ovary cyst, previously 4 cm.  Stable bilateral renal

atrophy with nonobstructing micro-calculi, 14.4 cm splenomegaly, and IUD.  No

free fluid/air.



Remaining liver, gallbladder, pancreas, spleen, adrenal glands, kidneys,

ureters, bladder, and aorta are unremarkable for noncontrast exam.



Osseous structures intact again with diffuse osteosclerosis.



Impression:

1.  Interval enlargement 4.6 cm left ovary cyst.  Pelvic sonogram may yield

further information.

2.  Incidental borderline cardiomegaly, bilateral renal atrophy with

nonobstructing micro-calculi, splenomegaly, and diffuse bony sclerosis

presumed from renal osteodystrophy.

3.  Remaining CT abdomen/pelvis without contrast exam is negative.



Comment: Preliminary interpretation made by [facility].  No critical discrepancy.

## 2021-11-09 ENCOUNTER — HOSPITAL ENCOUNTER (EMERGENCY)
Dept: HOSPITAL 33 - ED | Age: 31
LOS: 1 days | Discharge: HOME | End: 2021-11-10
Payer: MEDICARE

## 2021-11-09 DIAGNOSIS — N19: ICD-10-CM

## 2021-11-09 DIAGNOSIS — Z99.2: ICD-10-CM

## 2021-11-09 DIAGNOSIS — R04.0: Primary | ICD-10-CM

## 2021-11-09 DIAGNOSIS — Z79.899: ICD-10-CM

## 2021-11-09 PROCEDURE — 99283 EMERGENCY DEPT VISIT LOW MDM: CPT

## 2021-11-09 NOTE — ERPHSYRPT
- History of Present Illness


Time Seen by Provider: 11/09/21 22:20


Source: patient


Exam Limitations: no limitations


Physician History: 





This is a 31-year-old white female who is on hemodialysis and was dialyzed 

today.  She patient is allergic to heparin.  She is not on any anticoagulant 

therapy.  She does bruise easily since she has had renal failure.  Today, prior 

to arrival, patient did scratch the inner surface of her right nostril and she 

began bleeding.  She arrives without hypertension.  She has suffered no trauma 

other than the scratching right inner nostril earlier today.  Patient states 

that she has had nosebleed in the past but not for couple years.


Timing/Duration: abrupt onset


Prearrival Treatment: no prearrival treatment


Modifying Factors: Improves With: nothing


Allergies/Adverse Reactions: 








heparin Allergy (Severe, Verified 11/09/21 22:19)


   antibodies


hydromorphone [From Dilaudid] Allergy (Verified 11/09/21 22:19)


   


naproxen Allergy (Verified 11/09/21 22:19)


   


sulfamethoxazole [From Bactrim] Allergy (Verified 11/09/21 22:19)


   


trimethoprim [From Bactrim] Allergy (Verified 11/09/21 22:19)


   


vancomycin Allergy (Verified 11/09/21 22:19)


   





Home Medications: 








Alprazolam [Xanax] 0.5 mg PO BID 01/12/15 [History]


Fluvoxamine Maleate 100 mg PO BID 01/12/15 [History]


Levothyroxine Sodium 100 Mcg** [Synthroid 100 Mcg***] 137 mcg PO DAILY 01/12/15 

[History]


Lithium Carbonate 300 mg*** [Lithium Carbonate 300 MG***] 150 mg PO BID 01/12/15

[History]


Ferric Citrate [Auryxia] 420 mg PO UD 07/01/18 [History]


Ferric Citrate [Auryxia] 630 mg PO AC 07/01/18 [History]


Hydrocodone/Acetaminophen [Norco 5-325 Tablet] 1 each PO BID PRN PRN 07/01/18 

[History]


calcitrioL [Rocaltrol] 0.5 mcg PO DAILY 07/01/18 [History]


Albuterol Sulfate [Proair Hfa] 8.5 gm IH Q4H PRN PRN 06/11/19 [History]


Pantoprazole Sodium [Protonix] 40 mg PO DAILY 09/15/19 [History]


Carvedilol 12.5 mg*** [Coreg 12.5 mg***] 12.5 mg PO DAILY 12/26/20 [History]





Hx Tetanus, Diphtheria Vaccination/Date Given: Yes


Hx Influenza Vaccination/Date Given: Yes


Hx Pneumococcal Vaccination/Date Given: No





Travel Risk





- International Travel


Have you traveled outside of the country in past 3 weeks: No





- Coronavirus Screening


Are you exhibiting any of the following symptoms?: No


Close contact with a COVID-19 positive Pt in past 14-21 Days: No





- Vaccine Status


Have you recieved a Covid-19 vaccination: No





- Review of Systems


Constitutional: No Symptoms


Eyes: No Symptoms


Ears, Nose, & Throat: Epistaxis (Right nostril), Other


Cardiac: No Symptoms


Abdominal/Gastrointestinal: No Symptoms


Genitourinary Symptoms: No Symptoms


Musculoskeletal: No Symptoms


Skin: No Symptoms


Neurological: No Symptoms


Psychological: No Symptoms


Endocrine: No Symptoms


Hematologic/Lymphatic: No Symptoms


Immunological/Allergic: No Symptoms


All Other Systems: Reviewed and Negative





- Past Medical History


Pertinent Past Medical History: Yes


Neurological History: No Pertinent History


ENT History: No Pertinent History


Cardiac History: Hypertension, Other


Respiratory History: No Pertinent History


Endocrine Medical History: Hypothyroidism, Other


Musculoskeletal History: Fibromyalgia, Rheumatoid Arthritis


GI Medical History: No Pertinent History


 History: Renal Disease


Psycho-Social History: Anxiety, Attention Deficit Disorder, Bipolar, Depression,

Other


Female Reproductive Disorders: No Pertinent History


Other Medical History: ANXIETY, DEPRESSION, BIPOLAR, OCD.  LEFT FISTULAR UPPER 

ARM FOR DIALYSIS.  Tuesday, Thursday and Saturday





- Past Surgical History


Past Surgical History: Yes


Neuro Surgical History: No Pertinent History


Cardiac: Cardiac Catheterization


Respiratory: No Pertinent History


Gastrointestinal: No Pertinent History


Genitourinary: Other


Musculoskeletal: No Pertinent History


Female Surgical History: No Pertinent History


Other Surgical History: fistula placment left arm lower (does not work), 

dialysis cath rt subclavian, mirena placement, kidney biopsy.  aneurysm in left 

arm.  pd catheter removed.  fistula left arm upper is the one they use





- Social History


Smoking Status: Former smoker


How long have you smoked: 3


Exposure to second hand smoke: Yes


Drug Use: none


Patient Lives Alone: No





- Nursing Vital Signs


Nursing Vital Signs: 


                               Initial Vital Signs











Temperature  98.3 F   11/09/21 22:08


 


Pulse Rate  102 H  11/09/21 22:08


 


Respiratory Rate  18   11/09/21 22:08


 


Blood Pressure  124/90   11/09/21 22:08


 


O2 Sat by Pulse Oximetry  100   11/09/21 22:08








                                   Pain Scale











Pain Intensity                 0














Ordered Tests: 


Medication Summary














Discontinued Medications














Generic Name Dose Route Start Last Admin





  Trade Name Ale  PRN Reason Stop Dose Admin


 


Cephalexin HCl  Confirm  11/10/21 00:00 





  Cephalexin Mh***500 Mg Capsule  Administered  11/10/21 00:01 





  Dose  





  500 mg  





  .ROUTE  





  .STK-MED ONE  


 


Cephalexin HCl  500 mg  11/10/21 00:01 





  Cephalexin Mh***500 Mg Capsule  PO  11/10/21 00:02 





  STAT ONE  


 


Phenylephrine HCl  15 ml  11/09/21 22:35  11/09/21 22:45





  Neosynephrine 0.5% Nasal Spray/Drops  NS  11/09/21 22:36  15 ml





  STAT ONE   Administration














- Progress


Progress: improved


Progress Note: 





11/10/21 00:05


Medical decision making: This patient has renal failure and is on dialysis.  She

therefore does have some bleeding issues and bruises easily.  She is not on any 

anticoagulation therapy.  She did state that she did scratch the inside of her 

nasal mucosa on the right side.  It was then where she noticed bleeding.  We 

attempted [place]-Synephrine sprays in both nostrils with clipping of the nostrils 

with a nasal clip externally.  The nasal clip did control the external nostril 

bleeding but she felt blood dripping in her oropharynx.  Therefore, we placed a 

7.5 anterior/posterior Rhino Rocket in the right nostril.  We then inflated the 

balloon.  This substantially decrease the bleeding from the right nostril but 

did not stop it completely.  We have been slowly inflating the balloon.  Patient

is being placed on Keflex.  The plan is to continue the Rhino Rocket and 

inflating the balloon till the bleeding is coming controlled.  She no longer 

feels the blood dripping in the back of her oropharynx.  We will discharge her 

to home once this occurs and she will follow-up with ear nose and throat 

specialist.


Counseled pt/family regarding: diagnosis, need for follow-up





- Departure


Departure Disposition: Home


Clinical Impression: 


 Epistaxis





Condition: Stable


Critical Care Time: No


Referrals: 


SIMÓN SYKES NP [Primary Care Provider] - Follow up/PCP as directed


Additional Instructions: 


Keep the Rhino Rocket in place.  Call ear nose and throat specialist (names 

provided).  Take your antibiotics as prescribed.  If you notice that you begin 

bleeding again despite the Rhino Rocket in place, you can return to our 

facility.  However, we do not have ear nose and throat specialist.  As we 

discussed you can go directly to [facility] or [facility] in [facility].  They have ear nose and throat specialist that can provide the 

next level of care if necessary.


Prescriptions: 


Cephalexin Mh 500 mg** [Keflex 500 mg**] 500 mg PO TID #21 cap

## 2024-12-07 ENCOUNTER — HOSPITAL ENCOUNTER (EMERGENCY)
Dept: HOSPITAL 33 - ED | Age: 34
LOS: 1 days | End: 2024-12-08
Payer: MEDICARE

## 2024-12-07 ENCOUNTER — HOSPITAL ENCOUNTER (EMERGENCY)
Dept: HOSPITAL 33 - ED | Age: 34
Discharge: HOME | End: 2024-12-07
Payer: MEDICARE

## 2024-12-07 VITALS — DIASTOLIC BLOOD PRESSURE: 99 MMHG | HEART RATE: 73 BPM | SYSTOLIC BLOOD PRESSURE: 157 MMHG | RESPIRATION RATE: 19 BRPM

## 2024-12-07 VITALS — TEMPERATURE: 97.9 F | OXYGEN SATURATION: 99 % | RESPIRATION RATE: 18 BRPM

## 2024-12-07 VITALS — HEART RATE: 81 BPM

## 2024-12-07 VITALS — DIASTOLIC BLOOD PRESSURE: 74 MMHG | SYSTOLIC BLOOD PRESSURE: 99 MMHG

## 2024-12-07 VITALS — TEMPERATURE: 97.3 F

## 2024-12-07 VITALS — OXYGEN SATURATION: 98 %

## 2024-12-07 DIAGNOSIS — R04.0: Primary | ICD-10-CM

## 2024-12-07 LAB
APTT PPP: 30.7 SECONDS (ref 25.1–36.5)
BASOPHILS # BLD AUTO: 0.06 X10^3/UL (ref 0.01–0.08)
BASOPHILS NFR BLD AUTO: 0.7 % (ref 0.1–1.2)
EOSINOPHIL # BLD AUTO: 0.16 X10^3/UL (ref 0.04–0.36)
HCT VFR BLD AUTO: 41.2 % (ref 34.1–44.9)
HGB BLD-MCNC: 12.9 G/DL (ref 11.2–15.7)
IMM GRANULOCYTES # BLD: 0.06 X10^3U/L (ref 0–0.03)
IMM GRANULOCYTES NFR BLD: 0.7 % (ref 0–0.43)
INR PPP: 1 (ref 0.8–3)
LYMPHOCYTES # SPEC AUTO: 1.71 X10^3/UL (ref 1.18–3.74)
MCH RBC QN AUTO: 30.4 PG (ref 25.6–32.2)
MCHC RBC AUTO-ENTMCNC: 31.3 G/DL (ref 32.2–35.5)
MONOCYTES # BLD AUTO: 0.5 X10^3/UL (ref 0.24–0.86)
NRBC # BLD AUTO: 0 X10^3U/L (ref 0–0.01)
NRBC BLD AUTO-RTO: 0 % (ref 0–0.2)
PLATELET # BLD AUTO: 189 X10^3/UL (ref 182–369)
PROTHROMBIN TIME: 10.9 SECONDS (ref 9.4–12.5)
RBC # BLD AUTO: 4.25 X10^6/UL (ref 3.93–5.22)
WBC # BLD AUTO: 8.4 X10^3/UL (ref 3.98–10.04)

## 2024-12-07 PROCEDURE — 85025 COMPLETE CBC W/AUTO DIFF WBC: CPT

## 2024-12-07 PROCEDURE — 99282 EMERGENCY DEPT VISIT SF MDM: CPT

## 2024-12-07 PROCEDURE — 85610 PROTHROMBIN TIME: CPT

## 2024-12-07 PROCEDURE — 85730 THROMBOPLASTIN TIME PARTIAL: CPT

## 2024-12-07 PROCEDURE — 30901 CONTROL OF NOSEBLEED: CPT

## 2024-12-07 PROCEDURE — 99281 EMR DPT VST MAYX REQ PHY/QHP: CPT

## 2024-12-07 PROCEDURE — 99283 EMERGENCY DEPT VISIT LOW MDM: CPT

## 2024-12-07 PROCEDURE — 36415 COLL VENOUS BLD VENIPUNCTURE: CPT

## 2024-12-07 RX ADMIN — SILVER NITRATE ONE PKT: 38.21; 12.74 STICK TOPICAL at 05:28

## 2024-12-07 RX ADMIN — LIDOCAINE HYDROCHLORIDE ONE MG: 20 JELLY TOPICAL at 05:22

## 2024-12-07 RX ADMIN — OXYMETAZOLINE HCL ONE ML: 0.05 SPRAY NASAL at 04:55

## 2024-12-07 NOTE — ERPHSYRPT
- History of Present Illness


Time Seen by Provider: 12/07/24 22:44


Source: patient, family


Exam Limitations: no limitations


Patient Subjective Stated Complaint: Pt. reports, "I was here this morning for 

nose bleed, the used the stick thing to stop it.  It started bleeding again 

about 1 hour ago."


Triage Nursing Assessment: Pt. ambulated to room without difficulty, able to 

move all four ext.  A&Ox3, nasal clamp in place.


Physician History: 





Pt had onset of    nose bleed    early this am and was here having tx with afrin

and this worked a while. Hx is notable for renal transplant.  she has had this 

before the transplant. No hx trauma. Nontender on exam and bleeding from typical

diogenes triange anterior nares on right. 


Discussed with pt and available family risks and benefits of testing/Tx 

including  CBC, coag studies,   rhino rocket placement topical Antibiotic and 

they wish to proceed so these are ordered.   


Results discussed with pt and available family. 


Timing/Duration: persistent, this morning


Severity: moderate


Prearrival Treatment: over the counter meds, squeezing nostrils


Associated Symptoms: epistaxis, No sore throat, No difficulty swallowing, No 

voice change


Allergies/Adverse Reactions: 








heparin Allergy (Severe, Verified 12/07/24 04:43)


   antibodies


hydromorphone [From Dilaudid] Allergy (Verified 12/07/24 04:43)


   


naproxen Allergy (Verified 12/07/24 04:43)


   


sulfamethoxazole [From Bactrim] Allergy (Verified 12/07/24 04:43)


   


trimethoprim [From Bactrim] Allergy (Verified 12/07/24 04:43)


   


vancomycin Allergy (Verified 12/07/24 04:43)


   





Home Medications: 








Fluvoxamine Maleate 100 mg PO BID 01/12/15 [History]


Levothyroxine Sodium 100 Mcg** [Synthroid 100 Mcg***] 137 mcg PO DAILY 01/12/15 

[History]


Lithium Carbonate 300 mg*** [Lithium Carbonate 300 MG***] 150 mg PO BID 01/12/15

[History]


Pantoprazole Sodium [Protonix] 40 mg PO DAILY 09/15/19 [History]


Carvedilol 12.5 mg*** [Coreg 12.5 mg***] 12.5 mg PO BID 12/26/20 [History]


ALPRAZolam 0.25 MG*** [xanAX 0.25 MG***] 0.25 mg PO BID 12/07/24 [History]


Acetaminophen 325 mg PO DAILY PRN PRN 12/07/24 [History]


Aspirin 81 mg PO DAILY 12/07/24 [History]


Cinacalcet HCl [Sensipar] 90 mg PO UD 12/07/24 [History]


Docusate Sodium 100 mg*** [Docusate Sodium 100 MG***] 100 mg PO BID 12/07/24 

[History]


Ergocalciferol (Vitamin D2) [Vitamin D2] 50,000 unit PO Q7D 12/07/24 [History]


Losartan Potassium 100 mg PO DAILY 12/07/24 [History]


Prednisone [Radha] 1 mg PO DAILY 12/07/24 [History]


Tacrolimus 6 mg PO DAILY 12/07/24 [History]





Hx Tetanus, Diphtheria Vaccination/Date Given: Yes


Hx Influenza Vaccination/Date Given: Yes


Hx Pneumococcal Vaccination/Date Given: No





Travel Risk





- International Travel


Have you traveled outside of the country in past 3 weeks: No





- Emerging Infectious Disease


Are you exhibiting symptoms associated with any current EIDs: No





- Review of Systems


Constitutional: No Fever, No Chills


Eyes: No Symptoms


Ears, Nose, & Throat: No Symptoms, Epistaxis


Respiratory: No Cough, No Dyspnea


Cardiac: No Chest Pain, No Edema, No Syncope


Abdominal/Gastrointestinal: No Abdominal Pain, No Nausea, No Vomiting, No Danyell

rrhea


Genitourinary Symptoms: No Dysuria


Musculoskeletal: No Back Pain, No Neck Pain


Skin: No Rash


Neurological: No Dizziness, No Focal Weakness, No Sensory Changes


Psychological: No Symptoms


Endocrine: No Symptoms


Hematologic/Lymphatic: No Symptoms


Immunological/Allergic: No Symptoms


All Other Systems: Reviewed and Negative





- Past Medical History


Pertinent Past Medical History: Yes


Neurological History: No Pertinent History


ENT History: No Pertinent History


Cardiac History: Hypertension, Other


Respiratory History: No Pertinent History


Endocrine Medical History: Hypothyroidism, Other


Musculoskeletal History: Fibromyalgia, Rheumatoid Arthritis


GI Medical History: No Pertinent History


 History: Renal Disease


Psycho-Social History: Anxiety, Attention Deficit Disorder, Bipolar, Depression,

 Other


Female Reproductive Disorders: No Pertinent History


Other Medical History: ANXIETY, DEPRESSION, BIPOLAR, OCD.  LEFT FISTULAR UPPER 

ARM FOR DIALYSIS.  Tuesday, Thursday and Saturday





- Past Surgical History


Past Surgical History: Yes


Neuro Surgical History: No Pertinent History


Cardiac: Cardiac Catheterization


Respiratory: No Pertinent History


Gastrointestinal: No Pertinent History


Genitourinary: Other


Musculoskeletal: No Pertinent History


Female Surgical History: No Pertinent History


Other Surgical History: fistula placment left arm lower (does not work), 

dialysis cath rt subclavian, mirena placement, kidney biopsy.  aneurysm in left 

arm.  pd catheter removed.  fistula left arm upper is the one they use





- Female History


Hx Last Menstrual Period: 3 WEEKS AGO


Hx Pregnant Now: No





- Social History


Smoking Status: Never smoker


How long have you smoked: 3


Exposure to second hand smoke: Yes


Drug Use: none


Patient Lives Alone: No





- Social Determinants of Health


Will the patient participate in the screening: Yes


Do you worry about a steady place to live?: No


Do you have any problems with any of the following?: No known problems


In the past 12 months,have you had to go without utilities?: No


Transportation Issues: No


Has anyone in your support network made you feel unsafe?: No


Have you or anyone in your house had to go without enough: No





- Nursing Vital Signs


Nursing Vital Signs: 


                               Initial Vital Signs











Temperature  97.9 F   12/07/24 22:34


 


Pulse Rate  87   12/07/24 22:34


 


Respiratory Rate  18   12/07/24 22:34


 


Blood Pressure  129/96   12/07/24 22:34


 


O2 Sat by Pulse Oximetry  99   12/07/24 22:34








                                   Pain Scale











Pain Intensity                 0

















- Physical Exam


General Appearance: no apparent distress, alert


Eye Exam: bilateral eye: PERRL, EOMI


Ear Exam: bilateral ear: auricle normal, canal normal, TM normal


Nasal Exam: normal inspection, active bleeding


Throat Exam: pharynx normal, moist mucus membranes, No dental tenderness, No 

excessive drooling, No foreign body, No mandibular swelling, No maxillary 

swelling, No pharynx swelling, No pharynx tenderness, No tongue swollen, No 

tonsillar exudate, No trismus, No uvula swelling, No voice changes


Neck Exam: normal inspection, non-tender, supple, full range of motion, trachea 

midline


Cardiovascular/Respiratory Exam: normal breath sounds, regular rate/rhythm


Abdominal Exam: non-tender, soft


Neurologic Exam: alert, oriented x 3, sensation nml, No motor deficits


Skin Exam: normal color, warm, dry


**SpO2 Interpretation**: normal


SpO2: 99


O2 Delivery: Room Air


Ordered Tests: 


                               Active Orders 24 hr











 Category Date Time Status


 


 CBC W DIFF Stat Lab  12/07/24 23:13 Completed


 


 PT INR [PROTIME WITH INR] Stat Lab  12/07/24 23:13 Completed


 


 PTT Stat Lab  12/07/24 23:13 Completed








Medication Summary














Discontinued Medications














Generic Name Dose Route Start Last Admin





  Trade Name Roryq  PRN Reason Stop Dose Admin


 


Bacitracin Zinc  Confirm  12/07/24 23:03 





  Bacitracin Packet 1 Each Pckt  Administered  12/07/24 23:04 





  Dose  





  2 each  





  .ROUTE  





  .STK-MED ONE  











Lab/Rad Data: 


                           Laboratory Result Diagrams





                                 12/07/24 23:13 





                               Laboratory Results











  12/07/24 12/07/24 Range/Units





  23:13 23:13 


 


WBC   8.4  (3.98-10.04)  x10^3/uL


 


RBC   4.25  (3.93-5.22)  x10^6/uL


 


Hgb   12.9  (11.2-15.7)  g/dL


 


Hct   41.2  (34.1-44.9)  %


 


MCV   96.9 H  (79.4-94.8)  fL


 


MCH   30.4  (25.6-32.2)  pg


 


MCHC   31.3 L  (32.2-35.5)  g/dL


 


RDW   12.9  (11.7-14.4)  %


 


Plt Count   189  (182-369)  x10^3/uL


 


MPV   10.3  (9.4-12.3)  fL


 


Gran %   70.5  (34.0-71.1)  %


 


Immature Gran % (Auto)   0.7 H  (0.001-0.429)  %


 


Nucleat RBC Rel Count   0.0  (0.00-0.2)  %


 


Eos # (Auto)   0.16  (0.04-0.36)  x10^3/uL


 


Immature Gran # (Auto)   0.06 H  (0.001-0.031)  x10^3u/L


 


Absolute Lymphs (auto)   1.71  (1.18-3.74)  x10^3/uL


 


Absolute Monos (auto)   0.50  (0.24-0.86)  x10^3/uL


 


Absolute Nucleated RBC   0.00  (0.00-0.012)  x10^3u/L


 


Lymphocytes %   20.3  (19.3-51.7)  %


 


Monocytes %   5.9  (4.7-12.5)  %


 


Eosinophils %   1.9  (0.7-5.8)  %


 


Basophils %   0.7  (0.1-1.2)  %


 


Absolute Granulocytes   5.92  (1.56-6.13)  x10^3/uL


 


Basophils #   0.06  (0.01-0.08)  x10^3/uL


 


PT  10.9   (9.4-12.5)  SECONDS


 


INR  1.00   (0.8-3.0)  


 


APTT  30.7   (25.1-36.5)  SECONDS














- Progress


Progress: improved, re-examined


Progress Note: 





12/07/24 23:47


Rhino rocket placed right nares with bacitracin lubrication. balloon tested and 

rechecked after 20 minutes. 


12/07/24 23:48





12/07/24 23:56


pt is good on recheck and needs no further air in device.  bleeding has stopped.




Counseled pt/family regarding: lab results, diagnosis, need for follow-up





Medical Desision Making





- Independent Historian


Additional History obtained from: Family





- Discussion of managment


Reviewed:: Test results, Need for additional workup


Agreed on:: Treatment plan, need for follow-up





- Diagnostic Testing


Diagnostic test were ordered, analyzed, and reviewed by me: Yes


Radiological Interpretation: Interpreted by me





- Risk of complications


The pt has a mod risk of morbidity or mortality based on: Need for prescription 

drug management





- Departure


Departure Disposition: Home


Clinical Impression: 


 Epistaxis, persistent epistaxis right nares





Condition: Good


Critical Care Time: No


Referrals: 


VIRGINIE COX [Primary Care Provider] - Follow up/PCP as directed


Instructions:  Nosebleeds (DC)


Additional Instructions: 


have the device removed Monday at your medical providers office. return meantime

if further bleeding or any symptoms.

## 2024-12-07 NOTE — ERPHSYRPT
- History of Present Illness


Time Seen by Provider: 12/07/24 04:45


Source: patient


Exam Limitations: no limitations


Patient Subjective Stated Complaint: c/o nose bleed


Triage Nursing Assessment: patient brought into ED by mother with c/o nose bleed

form right nostril. Patient woke up at 0300 and swipe her nose and it started 

bleeding and wouldn't stop. patient denies being in any pain, hypertensive, skin

w/n/d, pulses normal, pt doesn't appear to be in any distress at this time.


Physician History: 


The patient presents with epistaxis.





They have experienced epistaxis that began at 3:00, with significant bleeding 

that is now slowing down. The right nostril is identified as the source of the 

bleeding.





This is the second occurrence of epistaxis. The first incident was due to a 

previous injury described as a 'busted' nose, but no further details are 

provided.


Timing/Duration: abrupt onset, this morning


Severity: moderate


ENT Location: nose (right nostril)


Prearrival Treatment: squeezing nostrils


Modifying Factors: Improves With: nothing


Associated Symptoms: epistaxis, No fever, No nasal foreign body


Allergies/Adverse Reactions: 








heparin Allergy (Severe, Verified 12/07/24 04:43)


   antibodies


hydromorphone [From Dilaudid] Allergy (Verified 12/07/24 04:43)


   


naproxen Allergy (Verified 12/07/24 04:43)


   


sulfamethoxazole [From Bactrim] Allergy (Verified 12/07/24 04:43)


   


trimethoprim [From Bactrim] Allergy (Verified 12/07/24 04:43)


   


vancomycin Allergy (Verified 12/07/24 04:43)


   





Home Medications: 








Fluvoxamine Maleate 100 mg PO BID 01/12/15 [History]


Levothyroxine Sodium 100 Mcg** [Synthroid 100 Mcg***] 137 mcg PO DAILY 01/12/15 

[History]


Lithium Carbonate 300 mg*** [Lithium Carbonate 300 MG***] 150 mg PO BID 01/12/15

[History]


Pantoprazole Sodium [Protonix] 40 mg PO DAILY 09/15/19 [History]


Carvedilol 12.5 mg*** [Coreg 12.5 mg***] 12.5 mg PO BID 12/26/20 [History]


ALPRAZolam 0.25 MG*** [xanAX 0.25 MG***] 0.25 mg PO BID 12/07/24 [History]


Acetaminophen 325 mg PO DAILY PRN PRN 12/07/24 [History]


Aspirin 81 mg PO DAILY 12/07/24 [History]


Cinacalcet HCl [Sensipar] 90 mg PO UD 12/07/24 [History]


Docusate Sodium 100 mg*** [Docusate Sodium 100 MG***] 100 mg PO BID 12/07/24 

[History]


Ergocalciferol (Vitamin D2) [Vitamin D2] 50,000 unit PO Q7D 12/07/24 [History]


Losartan Potassium 100 mg PO DAILY 12/07/24 [History]


Prednisone [Radha] 1 mg PO DAILY 12/07/24 [History]


Tacrolimus 6 mg PO DAILY 12/07/24 [History]





Hx Tetanus, Diphtheria Vaccination/Date Given: Yes


Hx Influenza Vaccination/Date Given: Yes


Hx Pneumococcal Vaccination/Date Given: No





Travel Risk





- International Travel


Have you traveled outside of the country in past 3 weeks: No





- Emerging Infectious Disease


Are you exhibiting symptoms associated with any current EIDs: No





- Review of Systems


All Other Systems: Reviewed and Negative





- Past Medical History


Pertinent Past Medical History: Yes


Neurological History: No Pertinent History


ENT History: No Pertinent History


Cardiac History: Hypertension, Other


Respiratory History: No Pertinent History


Endocrine Medical History: Hypothyroidism, Other


Musculoskeletal History: Fibromyalgia, Rheumatoid Arthritis


GI Medical History: No Pertinent History


 History: Renal Disease


Psycho-Social History: Anxiety, Attention Deficit Disorder, Bipolar, Depression,

 Other


Female Reproductive Disorders: No Pertinent History


Other Medical History: ANXIETY, DEPRESSION, BIPOLAR, OCD.  LEFT FISTULAR UPPER 

ARM FOR DIALYSIS.  Tuesday, Thursday and Saturday





- Past Surgical History


Past Surgical History: Yes


Neuro Surgical History: No Pertinent History


Cardiac: Cardiac Catheterization


Respiratory: No Pertinent History


Gastrointestinal: No Pertinent History


Genitourinary: Other


Musculoskeletal: No Pertinent History


Female Surgical History: No Pertinent History


Other Surgical History: fistula placment left arm lower (does not work), 

dialysis cath rt subclavian, mirena placement, kidney biopsy.  aneurysm in left 

arm.  pd catheter removed.  fistula left arm upper is the one they use





- Female History


Hx Last Menstrual Period: 3 WEEKS AGO


Hx Pregnant Now: No





- Social History


Smoking Status: Never smoker


How long have you smoked: 3


Exposure to second hand smoke: Yes


Drug Use: none


Patient Lives Alone: No





- Social Determinants of Health


Will the patient participate in the screening: Yes


Do you worry about a steady place to live?: No


Do you have any problems with any of the following?: No known problems


In the past 12 months,have you had to go without utilities?: No


Transportation Issues: No


Has anyone in your support network made you feel unsafe?: No


Have you or anyone in your house had to go without enough: No





- Nursing Vital Signs


Nursing Vital Signs: 


                               Initial Vital Signs











Temperature  97.3 F   12/07/24 04:33


 


Pulse Rate  73   12/07/24 04:33


 


Respiratory Rate  18   12/07/24 04:33


 


Blood Pressure  164/102   12/07/24 04:33


 


O2 Sat by Pulse Oximetry  98   12/07/24 04:33








                                   Pain Scale











Pain Intensity                 0

















- Physical Exam


General Appearance: no apparent distress


Nasal Exam: dried blood (right nostil, no active bleeding appreciated)


Neck Exam: normal inspection, full range of motion


Skin Exam: normal color, warm, dry


**SpO2 Interpretation**: normal


SpO2: 98


O2 Delivery: Room Air





Procedures





- Additional Procedures


Progress: 


Right nare





Urojet applied with cotton swab to area of bleeding on anterior right septum 

then silver nitrate was used to cauterize the area of bleeding. Hemostasis 

achieved and clip reappled. 





Time of procedure 0530





Patient tolerated well. 





- Course


Nursing assessment & vital signs reviewed: Yes


Ordered Tests: 


Medication Summary














Discontinued Medications














Generic Name Dose Route Start Last Admin





  Trade Name Freq  PRN Reason Stop Dose Admin


 


Bacitracin Zinc  Confirm  12/07/24 04:56 





  Bacitracin Packet 1 Each Pckt  Administered  12/07/24 04:57 





  Dose  





  1 each  





  .ROUTE  





  .STK-MED ONE  


 


Lidocaine HCl  Confirm  12/07/24 05:20 





  Lidocaine Hcl 20 Mg/Ml Jelly Uro-Jet  Administered  12/07/24 05:21 





  Dose  





  200 mg  





  .ROUTE  





  .STK-MED ONE  


 


Lidocaine HCl  200 mg  12/07/24 05:21  12/07/24 05:22





  Lidocaine Hcl 20 Mg/Ml Jelly Uro-Jet  TOP  12/07/24 05:22  200 mg





  STAT ONE   Administration


 


Oxymetazoline HCl  15 ml  12/07/24 04:44  12/07/24 04:55





  Oxymetazoline Nasal Spray 15ml Bottle  NS  12/07/24 04:45  15 ml





  STAT ONE   Administration


 


Silver Nitrate  1 pkt  12/07/24 05:25  12/07/24 05:28





  Silver Nitrate 1 Pkt Each  TP  12/07/24 05:26  1 pkt





  ONCE ONE   Administration


 


Silver Nitrate  Confirm  12/07/24 05:27 





  Silver Nitrate 1 Pkt Each  Administered  12/07/24 05:28 





  Dose  





  1 pkt  





  TP  





  .STK-MED ONE  














- Progress


Progress: improved


Progress Note: 


Epistaxis


Acute epistaxis from the right nostril, significant but now slowing. No active 

bleeding spots observed, potential site further back. Previous similar episode 

noted. Discussed Afrin spray and nasal plug for control, with further 

intervention if needed.


- Administer Afrin spray and bacitracin to the affected nostril


- Apply nasal plug and maintain pressure for 15 minutes


- Recheck after 15 minutes to assess bleeding status


- Consider further intervention if active bleeding persists.








On repeat evaluation area of bleeding identified, 2% viscous lidocaine applied 

to area and silver nitrate used to cauterize the anterior right septum. Patient 

tolerated procedure well, hemostasis achieved and clip reapplied to nose. 

Advised to wear clip for the next 3 hours. Avoid aspirin use for the next 72 

hours. Apply Vaseline to nares QD. If bleeding returns come to ER for 

reevaluation.  


Counseled pt/family regarding: diagnosis





Medical Desision Making





- Diagnostic Testing


Diagnostic test were ordered, analyzed, and reviewed by me: No





- Risk of complications


The pt has a mod risk of morbidity or mortality based on: Need for prescription 

drug management





- Departure


Departure Disposition: Home


Clinical Impression: 


 Epistaxis





Condition: Good


Critical Care Time: No


Referrals: 


VIRGINIE COX [Primary Care Provider] - Follow up/PCP as directed


Instructions:  Nosebleeds (DC)